# Patient Record
Sex: FEMALE | Race: BLACK OR AFRICAN AMERICAN | Employment: FULL TIME | URBAN - METROPOLITAN AREA
[De-identification: names, ages, dates, MRNs, and addresses within clinical notes are randomized per-mention and may not be internally consistent; named-entity substitution may affect disease eponyms.]

---

## 2017-10-20 ENCOUNTER — HOSPITAL ENCOUNTER (OUTPATIENT)
Age: 42
Discharge: HOME | End: 2017-10-20
Attending: FAMILY MEDICINE
Payer: COMMERCIAL

## 2017-10-20 VITALS
BODY MASS INDEX: 34.15 KG/M2 | WEIGHT: 200 LBS | RESPIRATION RATE: 18 BRPM | HEIGHT: 64 IN | SYSTOLIC BLOOD PRESSURE: 113 MMHG | OXYGEN SATURATION: 97 % | TEMPERATURE: 99 F | DIASTOLIC BLOOD PRESSURE: 82 MMHG | HEART RATE: 102 BPM

## 2017-10-20 DIAGNOSIS — J03.90 ACUTE TONSILLITIS, UNSPECIFIED ETIOLOGY: Primary | ICD-10-CM

## 2017-10-20 PROCEDURE — 87430 STREP A AG IA: CPT

## 2017-10-20 PROCEDURE — 99203 OFFICE O/P NEW LOW 30 MIN: CPT

## 2017-10-20 PROCEDURE — 99213 OFFICE O/P EST LOW 20 MIN: CPT

## 2017-10-20 RX ORDER — CLARITHROMYCIN 500 MG/1
500 TABLET, COATED ORAL 2 TIMES DAILY
Qty: 20 TABLET | Refills: 0 | Status: SHIPPED | OUTPATIENT
Start: 2017-10-20 | End: 2017-10-26

## 2017-10-20 NOTE — ED INITIAL ASSESSMENT (HPI)
Presents with sore throat, fever as high as 102F, chills, and body aches for 4 days. Also reports dry cough, nausea, and vomiting x2 on Wednesday.

## 2017-10-20 NOTE — ED PROVIDER NOTES
Patient Seen in: [facility] In [address] [place] [address]    History   Patient presents with:  Sore Throat    Stated Complaint: sore throat, fever    CC/HPI: Pt p/w co st, fever, nausea and few bouts of vomiting, temp to 102; Some cough also;   Jose Carlos Kitchen 113/82  Pulse: 102  Resp: 18  Temp: 99.1 °F (37.3 °C)  Temp src: Oral  SpO2: 97 %  O2 Device: None (Room air)    General Appearance:    Alert, cooperative, no distress, appears stated age  Head:    Normocephalic, without obvious abnormality, atraumatic  Ey Ht 162.6 cm (5' 4\")   Wt 90.7 kg   LMP 08/18/2017 (Within Days)   SpO2 97%   BMI 34.33 kg/m²         Physical Exam   Nursing note and vitals reviewed.             ED Course     Labs Reviewed   EM POCT RAPID STREP - Normal     Ss: neg  ====================

## 2017-10-22 ENCOUNTER — HOSPITAL ENCOUNTER (EMERGENCY)
Facility: HOSPITAL | Age: 42
Discharge: HOME | End: 2017-10-22
Attending: EMERGENCY MEDICINE
Payer: COMMERCIAL

## 2017-10-22 VITALS
DIASTOLIC BLOOD PRESSURE: 98 MMHG | SYSTOLIC BLOOD PRESSURE: 138 MMHG | WEIGHT: 200 LBS | OXYGEN SATURATION: 98 % | TEMPERATURE: 98 F | RESPIRATION RATE: 16 BRPM | BODY MASS INDEX: 34.15 KG/M2 | HEART RATE: 80 BPM | HEIGHT: 64 IN

## 2017-10-22 DIAGNOSIS — B34.9 VIRAL SYNDROME: Primary | ICD-10-CM

## 2017-10-22 PROCEDURE — 99283 EMERGENCY DEPT VISIT LOW MDM: CPT

## 2017-10-22 RX ORDER — DEXAMETHASONE SODIUM PHOSPHATE 4 MG/ML
8 VIAL (ML) INJECTION ONCE
Status: COMPLETED | OUTPATIENT
Start: 2017-10-22 | End: 2017-10-22

## 2017-10-22 RX ORDER — IBUPROFEN 600 MG/1
600 TABLET ORAL EVERY 8 HOURS PRN
Qty: 20 TABLET | Refills: 0 | Status: SHIPPED | OUTPATIENT
Start: 2017-10-22 | End: 2017-10-29

## 2017-10-22 NOTE — ED PROVIDER NOTES
Patient Seen in: [facility] Emergency Department    History   Patient presents with:  Fever (infectious)    Stated Complaint:     HPI     70-year-old female with no significant past medical history here with complaints of Subjective fever at home, Musculoskeletal: Negative for back pain. Skin: Negative for rash. Neurological: Negative for weakness, light-headedness and headaches. All other systems reviewed and are negative.       Positive for stated complaint:   Other systems are as noted in to nose b/l, normal gait, no facial asymmetry, normal speech     Skin: Skin is warm and dry. No rash noted. She is not diaphoretic. Psychiatric: She has a normal mood and affect. Nursing note and vitals reviewed.           ED Course   PROCEDURES:  none visit in 2 days  As needed      Medications Prescribed:  Discharge Medication List as of 10/22/2017 12:56 PM    START taking these medications    !! ibuprofen 600 MG Oral Tab  Take 1 tablet (600 mg total) by mouth every 8 (eight) hours as needed., Normal,

## 2017-10-26 ENCOUNTER — OFFICE VISIT (OUTPATIENT)
Dept: FAMILY MEDICINE CLINIC | Facility: CLINIC | Age: 42
End: 2017-10-26

## 2017-10-26 VITALS
WEIGHT: 250 LBS | TEMPERATURE: 98 F | SYSTOLIC BLOOD PRESSURE: 118 MMHG | DIASTOLIC BLOOD PRESSURE: 83 MMHG | HEIGHT: 63 IN | HEART RATE: 94 BPM | BODY MASS INDEX: 44.3 KG/M2

## 2017-10-26 DIAGNOSIS — H66.91 RIGHT OTITIS MEDIA, UNSPECIFIED OTITIS MEDIA TYPE: ICD-10-CM

## 2017-10-26 DIAGNOSIS — H10.9 CONJUNCTIVITIS OF BOTH EYES, UNSPECIFIED CONJUNCTIVITIS TYPE: Primary | ICD-10-CM

## 2017-10-26 PROCEDURE — 99213 OFFICE O/P EST LOW 20 MIN: CPT | Performed by: FAMILY MEDICINE

## 2017-10-26 PROCEDURE — 99212 OFFICE O/P EST SF 10 MIN: CPT | Performed by: FAMILY MEDICINE

## 2017-10-26 RX ORDER — AZITHROMYCIN 250 MG/1
TABLET, FILM COATED ORAL
Qty: 6 TABLET | Refills: 0 | Status: SHIPPED | OUTPATIENT
Start: 2017-10-26 | End: 2018-11-12

## 2017-10-26 RX ORDER — TOBRAMYCIN 3 MG/ML
2 SOLUTION/ DROPS OPHTHALMIC EVERY 6 HOURS
Qty: 1 BOTTLE | Refills: 1 | Status: SHIPPED | OUTPATIENT
Start: 2017-10-26 | End: 2017-11-02

## 2017-10-26 NOTE — PROGRESS NOTES
Read Thuy is a 43year old female.  Patient presents with:  Er F/u: 10/22    HPI:   Notes from Er:  51-year-old female with no significant past medical history here with complaints of Subjective fever at home, with complaints of sore throat, tinnitus, m (Oral)   Ht 5' 3\" (1.6 m)   Wt 250 lb (113.4 kg)   LMP 08/18/2017 (Within Days)   BMI 44.29 kg/m²   GENERAL: well developed, well nourished,in no apparent distress  SKIN: no rashes,no suspicious lesions  HEENT: atraumatic, normocephalic, throat are clear

## 2017-10-29 ENCOUNTER — PATIENT MESSAGE (OUTPATIENT)
Dept: FAMILY MEDICINE CLINIC | Facility: CLINIC | Age: 42
End: 2017-10-29

## 2017-10-31 NOTE — TELEPHONE ENCOUNTER
LMTCB. Please transfer to triage. Dr. Anat Baez please advise. Pt has been seen in [address] [place] [address] 10/20, 10/22 ER and 10/26 OV and still does not have significant relief.

## 2017-10-31 NOTE — TELEPHONE ENCOUNTER
From: Chintan Peraza  To: Lawrence Cruz MD  Sent: 10/29/2017 8:52 AM CDT  Subject: Visit Follow-up Question    Hi ,    I still have the ringing in my ears and i cant hear too well and my throat is still sore.  The light headed dizzy feeling is sti

## 2017-11-02 RX ORDER — PREDNISONE 20 MG/1
TABLET ORAL
Qty: 10 TABLET | Refills: 0 | Status: SHIPPED | OUTPATIENT
Start: 2017-11-02 | End: 2018-11-12

## 2017-11-03 NOTE — TELEPHONE ENCOUNTER
Dr Sanket Bonner already addressed this on 11/2/17. to Heydi Perry           1:32 PM   I sent short course of prednisone 20 mg 2 tablets daily for 5 days.  Take with food and try not to take it too late in afternoon or evenings because often wakes people up a

## 2018-03-17 ENCOUNTER — HOSPITAL ENCOUNTER (OUTPATIENT)
Dept: MAMMOGRAPHY | Age: 43
End: 2018-03-17
Attending: FAMILY MEDICINE
Payer: COMMERCIAL

## 2018-03-17 ENCOUNTER — HOSPITAL ENCOUNTER (OUTPATIENT)
Dept: MAMMOGRAPHY | Age: 43
Discharge: HOME | End: 2018-03-17
Attending: FAMILY MEDICINE
Payer: COMMERCIAL

## 2018-03-17 DIAGNOSIS — Z12.31 SCREENING MAMMOGRAM, ENCOUNTER FOR: ICD-10-CM

## 2018-03-17 PROCEDURE — 77067 SCR MAMMO BI INCL CAD: CPT | Performed by: FAMILY MEDICINE

## 2018-11-12 ENCOUNTER — HOSPITAL ENCOUNTER (OUTPATIENT)
Age: 43
Discharge: HOME | End: 2018-11-12
Attending: EMERGENCY MEDICINE
Payer: COMMERCIAL

## 2018-11-12 VITALS
SYSTOLIC BLOOD PRESSURE: 127 MMHG | OXYGEN SATURATION: 100 % | RESPIRATION RATE: 16 BRPM | BODY MASS INDEX: 52 KG/M2 | WEIGHT: 293 LBS | HEART RATE: 76 BPM | TEMPERATURE: 98 F | DIASTOLIC BLOOD PRESSURE: 85 MMHG

## 2018-11-12 DIAGNOSIS — H11.31 SUBCONJUNCTIVAL HEMORRHAGE OF RIGHT EYE: Primary | ICD-10-CM

## 2018-11-12 PROCEDURE — 99213 OFFICE O/P EST LOW 20 MIN: CPT

## 2018-11-12 PROCEDURE — 99212 OFFICE O/P EST SF 10 MIN: CPT

## 2018-11-12 NOTE — ED INITIAL ASSESSMENT (HPI)
Pt reports redness to R eye and bilateral ear pain since yesterday. Pt felt a sharp pain shoot down the the R side of her face and R ear popping that lasted for a few minutes yesterday.

## 2018-11-17 NOTE — ED PROVIDER NOTES
Patient Seen in: [facility] In [address] [place] [address]    History   Patient presents with: Eye Visual Problem (opthalmic)    Stated Complaint: ear and eye problem    HPI    38 yo female with redness to the right eye that she noticed yesterday.  No adenopathy. Neurological: She is alert and oriented to person, place, and time. No cranial nerve deficit. Skin: Skin is warm and dry. Psychiatric: She has a normal mood and affect. Her behavior is normal.   Nursing note and vitals reviewed.

## 2019-01-07 ENCOUNTER — APPOINTMENT (OUTPATIENT)
Dept: CT IMAGING | Facility: HOSPITAL | Age: 44
End: 2019-01-07
Attending: EMERGENCY MEDICINE
Payer: COMMERCIAL

## 2019-01-07 ENCOUNTER — APPOINTMENT (OUTPATIENT)
Dept: GENERAL RADIOLOGY | Facility: HOSPITAL | Age: 44
End: 2019-01-07
Payer: COMMERCIAL

## 2019-01-07 ENCOUNTER — HOSPITAL ENCOUNTER (OUTPATIENT)
Age: 44
Discharge: HOME | End: 2019-01-07
Attending: EMERGENCY MEDICINE
Payer: COMMERCIAL

## 2019-01-07 ENCOUNTER — HOSPITAL ENCOUNTER (EMERGENCY)
Facility: HOSPITAL | Age: 44
Discharge: HOME | End: 2019-01-07
Payer: COMMERCIAL

## 2019-01-07 VITALS
HEIGHT: 63 IN | OXYGEN SATURATION: 95 % | HEART RATE: 77 BPM | TEMPERATURE: 98 F | BODY MASS INDEX: 44.3 KG/M2 | WEIGHT: 250 LBS | RESPIRATION RATE: 18 BRPM | DIASTOLIC BLOOD PRESSURE: 89 MMHG | SYSTOLIC BLOOD PRESSURE: 123 MMHG

## 2019-01-07 VITALS
BODY MASS INDEX: 44 KG/M2 | WEIGHT: 250 LBS | DIASTOLIC BLOOD PRESSURE: 83 MMHG | TEMPERATURE: 97 F | HEART RATE: 82 BPM | OXYGEN SATURATION: 98 % | SYSTOLIC BLOOD PRESSURE: 121 MMHG | RESPIRATION RATE: 16 BRPM

## 2019-01-07 DIAGNOSIS — R07.9 CHEST PAIN OF UNCERTAIN ETIOLOGY: Primary | ICD-10-CM

## 2019-01-07 DIAGNOSIS — R07.89 CHEST PAIN, ATYPICAL: Primary | ICD-10-CM

## 2019-01-07 DIAGNOSIS — J18.9 ATYPICAL PNEUMONIA: ICD-10-CM

## 2019-01-07 LAB
ANION GAP SERPL CALC-SCNC: 12 MMOL/L (ref 0–18)
B-HCG UR QL: NEGATIVE
BASOPHILS # BLD: 0.1 K/UL (ref 0–0.2)
BASOPHILS NFR BLD: 1 %
BUN SERPL-MCNC: 16 MG/DL (ref 8–20)
BUN/CREAT SERPL: 22.2 (ref 10–20)
CALCIUM SERPL-MCNC: 9.3 MG/DL (ref 8.5–10.5)
CHLORIDE SERPL-SCNC: 102 MMOL/L (ref 95–110)
CO2 SERPL-SCNC: 22 MMOL/L (ref 22–32)
CREAT SERPL-MCNC: 0.72 MG/DL (ref 0.5–1.5)
D DIMER PPP FEU-MCNC: 1.31 MCG/ML (ref ?–0.5)
EOSINOPHIL # BLD: 0.3 K/UL (ref 0–0.7)
EOSINOPHIL NFR BLD: 3 %
ERYTHROCYTE [DISTWIDTH] IN BLOOD BY AUTOMATED COUNT: 15 % (ref 11–15)
GLUCOSE SERPL-MCNC: 96 MG/DL (ref 70–99)
HCT VFR BLD AUTO: 41.4 % (ref 35–48)
HGB BLD-MCNC: 13.2 G/DL (ref 12–16)
LYMPHOCYTES # BLD: 2.1 K/UL (ref 1–4)
LYMPHOCYTES NFR BLD: 22 %
MCH RBC QN AUTO: 27.7 PG (ref 27–32)
MCHC RBC AUTO-ENTMCNC: 32 G/DL (ref 32–37)
MCV RBC AUTO: 86.4 FL (ref 80–100)
MONOCYTES # BLD: 0.5 K/UL (ref 0–1)
MONOCYTES NFR BLD: 5 %
NEUTROPHILS # BLD AUTO: 6.9 K/UL (ref 1.8–7.7)
NEUTROPHILS NFR BLD: 70 %
OSMOLALITY UR CALC.SUM OF ELEC: 283 MOSM/KG (ref 275–295)
PLATELET # BLD AUTO: 330 K/UL (ref 140–400)
PMV BLD AUTO: 7.5 FL (ref 7.4–10.3)
POTASSIUM SERPL-SCNC: 3.7 MMOL/L (ref 3.3–5.1)
RBC # BLD AUTO: 4.79 M/UL (ref 3.7–5.4)
SODIUM SERPL-SCNC: 136 MMOL/L (ref 136–144)
TROPONIN I SERPL-MCNC: 0 NG/ML (ref ?–0.03)
WBC # BLD AUTO: 9.8 K/UL (ref 4–11)

## 2019-01-07 PROCEDURE — 85379 FIBRIN DEGRADATION QUANT: CPT | Performed by: EMERGENCY MEDICINE

## 2019-01-07 PROCEDURE — 99214 OFFICE O/P EST MOD 30 MIN: CPT

## 2019-01-07 PROCEDURE — 93005 ELECTROCARDIOGRAM TRACING: CPT

## 2019-01-07 PROCEDURE — 80048 BASIC METABOLIC PNL TOTAL CA: CPT

## 2019-01-07 PROCEDURE — 93010 ELECTROCARDIOGRAM REPORT: CPT

## 2019-01-07 PROCEDURE — 93010 ELECTROCARDIOGRAM REPORT: CPT | Performed by: INTERNAL MEDICINE

## 2019-01-07 PROCEDURE — 71260 CT THORAX DX C+: CPT | Performed by: EMERGENCY MEDICINE

## 2019-01-07 PROCEDURE — 71046 X-RAY EXAM CHEST 2 VIEWS: CPT

## 2019-01-07 PROCEDURE — 99285 EMERGENCY DEPT VISIT HI MDM: CPT

## 2019-01-07 PROCEDURE — 81025 URINE PREGNANCY TEST: CPT

## 2019-01-07 PROCEDURE — 85025 COMPLETE CBC W/AUTO DIFF WBC: CPT

## 2019-01-07 PROCEDURE — 93010 ELECTROCARDIOGRAM REPORT: CPT | Performed by: EMERGENCY MEDICINE

## 2019-01-07 PROCEDURE — 36415 COLL VENOUS BLD VENIPUNCTURE: CPT

## 2019-01-07 PROCEDURE — 84484 ASSAY OF TROPONIN QUANT: CPT

## 2019-01-07 RX ORDER — AZITHROMYCIN 500 MG/1
TABLET, FILM COATED ORAL
Qty: 5 TABLET | Refills: 0 | Status: SHIPPED | OUTPATIENT
Start: 2019-01-07 | End: 2019-02-20 | Stop reason: ALTCHOICE

## 2019-01-07 RX ORDER — PANTOPRAZOLE SODIUM 20 MG/1
20 TABLET, DELAYED RELEASE ORAL DAILY
Qty: 14 TABLET | Refills: 0 | Status: SHIPPED | OUTPATIENT
Start: 2019-01-07 | End: 2019-01-21

## 2019-01-08 NOTE — ED PROVIDER NOTES
Patient Seen in: [facility] Emergency Department    History   Patient presents with:  Chest Pain Angina (cardiovascular)    Stated Complaint: Chest Pain     HPI    Patient is a 14-year-old female with a history of peptic ulcer disease and GE reflu are normal. Pupils are equal, round, and reactive to light. Neck: Neck supple. Cardiovascular: Normal rate, regular rhythm, normal heart sounds and intact distal pulses.     Pulmonary/Chest: Effort normal and breath sounds normal. No respiratory distres 85  Rhythm: Sinus Rhythm  Reading: Normal              Xr Chest Pa + Lat Chest (cpt=71046)    Result Date: 1/7/2019  CONCLUSION:   Negative for radiographically evident acute intrathoracic process.   Dictated by (CST): Gonzalo Salcedo MD on 1/07/2019 at 20:

## 2019-01-08 NOTE — ED PROVIDER NOTES
Patient Seen in: [facility] Immediate Care In [address] [place] [address]    History   Patient presents with:  Chest Pain Angina (cardiovascular)    Stated Complaint: chest pain    HPI    80-year-old female with several weeks of intermittent left-sided chest discomf normal. She exhibits no distension and no mass. There is no tenderness. There is no rebound and no guarding. Musculoskeletal: Normal range of motion. She exhibits no edema or tenderness. Lymphadenopathy:     She has no cervical adenopathy.    Neurologic

## 2019-01-08 NOTE — ED INITIAL ASSESSMENT (HPI)
C/o chest pain x3 weeks which has waxed and waned, states today was more constant, denies cp at this time

## 2019-02-20 ENCOUNTER — HOSPITAL ENCOUNTER (OUTPATIENT)
Dept: GENERAL RADIOLOGY | Age: 44
Discharge: HOME | End: 2019-02-20
Attending: FAMILY MEDICINE
Payer: COMMERCIAL

## 2019-02-20 ENCOUNTER — OFFICE VISIT (OUTPATIENT)
Dept: FAMILY MEDICINE CLINIC | Facility: CLINIC | Age: 44
End: 2019-02-20
Payer: COMMERCIAL

## 2019-02-20 VITALS
DIASTOLIC BLOOD PRESSURE: 75 MMHG | BODY MASS INDEX: 45.54 KG/M2 | HEIGHT: 63 IN | WEIGHT: 257 LBS | HEART RATE: 79 BPM | SYSTOLIC BLOOD PRESSURE: 107 MMHG

## 2019-02-20 DIAGNOSIS — G89.29 CHRONIC MIDLINE LOW BACK PAIN WITHOUT SCIATICA: ICD-10-CM

## 2019-02-20 DIAGNOSIS — R93.89 ABNORMAL CT OF THE CHEST: ICD-10-CM

## 2019-02-20 DIAGNOSIS — M54.50 CHRONIC MIDLINE LOW BACK PAIN WITHOUT SCIATICA: ICD-10-CM

## 2019-02-20 DIAGNOSIS — Z01.419 ROUTINE GYNECOLOGICAL EXAMINATION: Primary | ICD-10-CM

## 2019-02-20 DIAGNOSIS — N91.2 AMENORRHEA: ICD-10-CM

## 2019-02-20 DIAGNOSIS — Z12.31 VISIT FOR SCREENING MAMMOGRAM: ICD-10-CM

## 2019-02-20 DIAGNOSIS — E66.01 MORBID OBESITY WITH BMI OF 40.0-44.9, ADULT (HCC): ICD-10-CM

## 2019-02-20 PROCEDURE — 72110 X-RAY EXAM L-2 SPINE 4/>VWS: CPT | Performed by: FAMILY MEDICINE

## 2019-02-20 PROCEDURE — 99396 PREV VISIT EST AGE 40-64: CPT | Performed by: FAMILY MEDICINE

## 2019-02-20 PROCEDURE — 99212 OFFICE O/P EST SF 10 MIN: CPT | Performed by: FAMILY MEDICINE

## 2019-02-20 PROCEDURE — 99213 OFFICE O/P EST LOW 20 MIN: CPT | Performed by: FAMILY MEDICINE

## 2019-02-20 RX ORDER — METRONIDAZOLE 7.5 MG/G
1 GEL VAGINAL NIGHTLY
Qty: 1 TUBE | Refills: 0 | Status: SHIPPED | OUTPATIENT
Start: 2019-02-20 | End: 2019-02-25

## 2019-02-20 NOTE — PROGRESS NOTES
HPI:   Yohannes Griffith is a 40year old female who presents for a complete physical exam.   Patient's last menstrual period was 08/18/2017 (within days). No menses for 2 years. No hot flashes. Frustrated with her weight. Thinking about surgery.    Also con denies dyspareunia  MUSCULOSKELETAL: pos back pain  NEURO: denies headaches  PSYCHE: denies depression or anxiety  HEMATOLOGIC: denies hx of anemia  ENDOCRINE: denies thyroid history  ALL/ASTHMA: denies hx of allergy or asthma    EXAM:   /75 (BP [facility] x-ray and referral to chiropractor  - XR LUMBAR SPINE (MIN 4 VIEWS) (CPT=72110); Future  - CHIROPRACTIC  - INTERNAL       Pap and pelvic done. Order put in for mammogram.. Self breast exam explained. Health maintenance.  Pt' s weight is Body mass index is 4

## 2019-02-21 LAB — HPV I/H RISK 1 DNA SPEC QL NAA+PROBE: POSITIVE

## 2019-02-24 LAB
HPV16 DNA CVX QL PROBE+SIG AMP: NEGATIVE
HPV18 DNA CVX QL PROBE+SIG AMP: POSITIVE

## 2019-02-25 NOTE — PROGRESS NOTES
Celio Juan - Your x-rays shows mild disc narrowing which can cause some impingement on the nerve and pain.  Please see the chiropractor and he can help you with adjustments to help this and physical therapy would be good. - Dr. Adela Goddard

## 2019-02-25 NOTE — PROGRESS NOTES
I Called pt but no answer so advised to call back to get test results. 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 Needs to have colpo. Please give number for dr. Rhina Ortiz group to schedule an appt.

## 2019-03-02 ENCOUNTER — LAB ENCOUNTER (OUTPATIENT)
Dept: LAB | Facility: HOSPITAL | Age: 44
End: 2019-03-02
Attending: FAMILY MEDICINE
Payer: COMMERCIAL

## 2019-03-02 DIAGNOSIS — N91.2 AMENORRHEA: ICD-10-CM

## 2019-03-02 DIAGNOSIS — Z01.419 ROUTINE GYNECOLOGICAL EXAMINATION: ICD-10-CM

## 2019-03-02 LAB
ALBUMIN SERPL-MCNC: 3.7 G/DL (ref 3.4–5)
ALBUMIN/GLOB SERPL: 0.9 {RATIO} (ref 1–2)
ALP LIVER SERPL-CCNC: 105 U/L (ref 37–98)
ALT SERPL-CCNC: 14 U/L (ref 13–56)
ANION GAP SERPL CALC-SCNC: 6 MMOL/L (ref 0–18)
AST SERPL-CCNC: 14 U/L (ref 15–37)
BILIRUB SERPL-MCNC: 0.3 MG/DL (ref 0.1–2)
BUN BLD-MCNC: 18 MG/DL (ref 7–18)
BUN/CREAT SERPL: 25.4 (ref 10–20)
CALCIUM BLD-MCNC: 8.9 MG/DL (ref 8.5–10.1)
CHLORIDE SERPL-SCNC: 107 MMOL/L (ref 98–107)
CHOLEST SMN-MCNC: 236 MG/DL (ref ?–200)
CO2 SERPL-SCNC: 27 MMOL/L (ref 21–32)
CREAT BLD-MCNC: 0.71 MG/DL (ref 0.55–1.02)
FSH SERPL-ACNC: 65.5 MIU/ML
GLOBULIN PLAS-MCNC: 4.3 G/DL (ref 2.8–4.4)
GLUCOSE BLD-MCNC: 92 MG/DL (ref 70–99)
HDLC SERPL-MCNC: 72 MG/DL (ref 40–59)
LDLC SERPL CALC-MCNC: 148 MG/DL (ref ?–100)
M PROTEIN MFR SERPL ELPH: 8 G/DL (ref 6.4–8.2)
NONHDLC SERPL-MCNC: 164 MG/DL (ref ?–130)
OSMOLALITY SERPL CALC.SUM OF ELEC: 292 MOSM/KG (ref 275–295)
POTASSIUM SERPL-SCNC: 4 MMOL/L (ref 3.5–5.1)
SODIUM SERPL-SCNC: 140 MMOL/L (ref 136–145)
TRIGL SERPL-MCNC: 78 MG/DL (ref 30–149)
TSI SER-ACNC: 2.25 MIU/ML (ref 0.36–3.74)
VIT B12 SERPL-MCNC: 581 PG/ML (ref 193–986)
VLDLC SERPL CALC-MCNC: 16 MG/DL (ref 0–30)

## 2019-03-02 PROCEDURE — 82671 ASSAY OF ESTROGENS: CPT

## 2019-03-02 PROCEDURE — 82306 VITAMIN D 25 HYDROXY: CPT

## 2019-03-02 PROCEDURE — 82607 VITAMIN B-12: CPT

## 2019-03-02 PROCEDURE — 36415 COLL VENOUS BLD VENIPUNCTURE: CPT

## 2019-03-02 PROCEDURE — 80053 COMPREHEN METABOLIC PANEL: CPT

## 2019-03-02 PROCEDURE — 84443 ASSAY THYROID STIM HORMONE: CPT

## 2019-03-02 PROCEDURE — 83001 ASSAY OF GONADOTROPIN (FSH): CPT

## 2019-03-02 PROCEDURE — 80061 LIPID PANEL: CPT

## 2019-03-04 LAB — 25(OH)D3 SERPL-MCNC: 10.5 NG/ML (ref 30–100)

## 2019-03-05 LAB
ESTRADIOL BY TMS: 14.4 PG/ML
ESTROGENS TOTAL CALCULATION: 70.1 PG/ML
ESTRONE BY TMS: 55.7 PG/ML

## 2019-03-09 ENCOUNTER — HOSPITAL ENCOUNTER (OUTPATIENT)
Dept: CT IMAGING | Age: 44
Discharge: HOME | End: 2019-03-09
Attending: FAMILY MEDICINE
Payer: COMMERCIAL

## 2019-03-09 DIAGNOSIS — R93.89 ABNORMAL CT OF THE CHEST: ICD-10-CM

## 2019-03-09 PROCEDURE — 71250 CT THORAX DX C-: CPT | Performed by: FAMILY MEDICINE

## 2019-03-12 ENCOUNTER — TELEPHONE (OUTPATIENT)
Dept: FAMILY MEDICINE CLINIC | Facility: CLINIC | Age: 44
End: 2019-03-12

## 2019-03-12 ENCOUNTER — TELEPHONE (OUTPATIENT)
Dept: OBGYN CLINIC | Facility: CLINIC | Age: 44
End: 2019-03-12

## 2019-03-12 ENCOUNTER — APPOINTMENT (OUTPATIENT)
Dept: LAB | Facility: HOSPITAL | Age: 44
End: 2019-03-12
Attending: OBSTETRICS & GYNECOLOGY
Payer: COMMERCIAL

## 2019-03-12 ENCOUNTER — OFFICE VISIT (OUTPATIENT)
Dept: OBGYN CLINIC | Facility: CLINIC | Age: 44
End: 2019-03-12
Payer: COMMERCIAL

## 2019-03-12 VITALS
DIASTOLIC BLOOD PRESSURE: 85 MMHG | HEART RATE: 82 BPM | SYSTOLIC BLOOD PRESSURE: 132 MMHG | BODY MASS INDEX: 45 KG/M2 | WEIGHT: 256 LBS

## 2019-03-12 DIAGNOSIS — R87.610 ASCUS WITH POSITIVE HIGH RISK HPV CERVICAL: ICD-10-CM

## 2019-03-12 DIAGNOSIS — R87.810 ASCUS WITH POSITIVE HIGH RISK HPV CERVICAL: ICD-10-CM

## 2019-03-12 DIAGNOSIS — Z32.00 PREGNANCY EXAMINATION OR TEST, PREGNANCY UNCONFIRMED: Primary | ICD-10-CM

## 2019-03-12 DIAGNOSIS — R87.810 ASCUS WITH POSITIVE HIGH RISK HPV CERVICAL: Primary | ICD-10-CM

## 2019-03-12 DIAGNOSIS — Z32.00 PREGNANCY EXAMINATION OR TEST, PREGNANCY UNCONFIRMED: ICD-10-CM

## 2019-03-12 DIAGNOSIS — R87.610 ASCUS WITH POSITIVE HIGH RISK HPV CERVICAL: Primary | ICD-10-CM

## 2019-03-12 LAB — B-HCG SERPL-ACNC: 5 MIU/ML

## 2019-03-12 PROCEDURE — 36415 COLL VENOUS BLD VENIPUNCTURE: CPT

## 2019-03-12 PROCEDURE — 99203 OFFICE O/P NEW LOW 30 MIN: CPT | Performed by: OBSTETRICS & GYNECOLOGY

## 2019-03-12 PROCEDURE — 84703 CHORIONIC GONADOTROPIN ASSAY: CPT

## 2019-03-12 PROCEDURE — 84702 CHORIONIC GONADOTROPIN TEST: CPT

## 2019-03-12 RX ORDER — CHOLECALCIFEROL (VITAMIN D3) 1250 MCG
1 CAPSULE ORAL WEEKLY
Qty: 12 CAPSULE | Refills: 2 | Status: SHIPPED | OUTPATIENT
Start: 2019-03-12 | End: 2021-01-04

## 2019-03-12 NOTE — PROGRESS NOTES
Earnestine - Your vitamin D levels are extremely low. I want you to start on weekly vitamin D 50,000 units. I am sending it in. Also cholesterol is quite high - but better compared to 2 years ago. Continue improving your diet and increased exercise.  Also your h

## 2019-03-12 NOTE — TELEPHONE ENCOUNTER
Spoke with pt and informed her of MAZs recs. Pt informed that quant is in process and pt instructed to call tomorrow AM for results. Pt informed that based on results, her colpo may need to be cancelled. Pt verbalized understanding.

## 2019-03-12 NOTE — TELEPHONE ENCOUNTER
----- Message from Ralph Brooks DO sent at 3/12/2019  4:27 PM CDT -----  Please have lab turn this into a quant. Pt hasn't had period in 3 years. Scheduled for colpo tomorrow. May need to reschedule.

## 2019-03-13 ENCOUNTER — OFFICE VISIT (OUTPATIENT)
Dept: OBGYN CLINIC | Facility: CLINIC | Age: 44
End: 2019-03-13
Payer: COMMERCIAL

## 2019-03-13 VITALS
SYSTOLIC BLOOD PRESSURE: 119 MMHG | DIASTOLIC BLOOD PRESSURE: 82 MMHG | HEART RATE: 75 BPM | BODY MASS INDEX: 45 KG/M2 | WEIGHT: 256.63 LBS

## 2019-03-13 DIAGNOSIS — R87.610 ASCUS WITH POSITIVE HIGH RISK HPV CERVICAL: Primary | ICD-10-CM

## 2019-03-13 DIAGNOSIS — R87.810 ASCUS WITH POSITIVE HIGH RISK HPV CERVICAL: Primary | ICD-10-CM

## 2019-03-13 PROCEDURE — 57456 ENDOCERV CURETTAGE W/SCOPE: CPT | Performed by: OBSTETRICS & GYNECOLOGY

## 2019-03-13 NOTE — H&P
HPI:  The patient is a 12-year-old sexually active female who presents for consultation regarding abnormal Pap smear. Sent by her PCP.   Patient had a Pap smear on 2/20/2019 that resulted as an ASCUS HPV positive Pap smear with a high risk HPV positive gen file        Gets together: Not on file        Attends Rastafarian service: Not on file        Active member of club or organization: Not on file        Attends meetings of clubs or organizations: Not on file        Relationship status: Not on file      Intim HPV positive Pap smear from PCP  -We discussed the need for colposcopy to evaluate her abnormal Pap smear.   We discussed what a Pap smear  screening, HPV, and indications for colposcopy based on ASCCP guidelines.  -Patient will need serum hCG the day befor

## 2019-03-13 NOTE — PROGRESS NOTES
Yuli Price - The area of concern on the CT of the lungs is decreasing in size which is consistent with an infection.  Will plant to repeat it in 1 year. - Dr. Ravindra Oswald

## 2019-03-13 NOTE — PROCEDURES
Colpo w/Cx Biopsy and ECC    Pregnancy Results: negative from blood test   Birth control method(s) used: condoms; no period x 3 years    Consent signed.     Procedure discussed with patient in detail including indication, risk, benefits, alternatives and co

## 2019-03-16 ENCOUNTER — HOSPITAL ENCOUNTER (OUTPATIENT)
Dept: MAMMOGRAPHY | Age: 44
Discharge: HOME | End: 2019-03-16
Attending: FAMILY MEDICINE
Payer: COMMERCIAL

## 2019-03-16 DIAGNOSIS — Z12.31 VISIT FOR SCREENING MAMMOGRAM: ICD-10-CM

## 2019-03-16 PROCEDURE — 77067 SCR MAMMO BI INCL CAD: CPT | Performed by: FAMILY MEDICINE

## 2019-03-16 PROCEDURE — 77063 BREAST TOMOSYNTHESIS BI: CPT | Performed by: FAMILY MEDICINE

## 2019-03-18 ENCOUNTER — PATIENT MESSAGE (OUTPATIENT)
Dept: OBGYN CLINIC | Facility: CLINIC | Age: 44
End: 2019-03-18

## 2019-03-18 NOTE — TELEPHONE ENCOUNTER
From: Gaston Argueta  To: Laina Javier DO  Sent: 3/18/2019 12:30 PM CDT  Subject: Test Results Question    Hi Dr. Lilliam Ramos all is well! This is Enrigue Bel, I was wondering if my test results were available?  If so can you please let me k

## 2019-04-24 ENCOUNTER — OFFICE VISIT (OUTPATIENT)
Dept: SURGERY | Facility: CLINIC | Age: 44
End: 2019-04-24
Payer: COMMERCIAL

## 2019-04-24 VITALS
SYSTOLIC BLOOD PRESSURE: 117 MMHG | HEIGHT: 63 IN | OXYGEN SATURATION: 99 % | HEART RATE: 76 BPM | DIASTOLIC BLOOD PRESSURE: 79 MMHG | BODY MASS INDEX: 45.18 KG/M2 | RESPIRATION RATE: 16 BRPM | WEIGHT: 255 LBS

## 2019-04-24 DIAGNOSIS — E66.01 MORBID OBESITY WITH BMI OF 45.0-49.9, ADULT (HCC): ICD-10-CM

## 2019-04-24 DIAGNOSIS — E55.9 VITAMIN D DEFICIENCY: ICD-10-CM

## 2019-04-24 DIAGNOSIS — R12 HEART BURN: ICD-10-CM

## 2019-04-24 DIAGNOSIS — E78.00 HYPERCHOLESTEREMIA: Primary | ICD-10-CM

## 2019-04-24 DIAGNOSIS — R63.5 WEIGHT GAIN: ICD-10-CM

## 2019-04-24 PROCEDURE — 99204 OFFICE O/P NEW MOD 45 MIN: CPT | Performed by: INTERNAL MEDICINE

## 2019-04-24 RX ORDER — PHENTERMINE HYDROCHLORIDE 15 MG/1
15 CAPSULE ORAL EVERY MORNING
Qty: 30 CAPSULE | Refills: 2 | Status: SHIPPED | OUTPATIENT
Start: 2019-04-24 | End: 2019-07-24 | Stop reason: ALTCHOICE

## 2019-04-24 NOTE — PROGRESS NOTES
[facility] Consultation Note       Date of Consult:  2019    Patient:  Gaston Argueta  :      1975  MRN:      [id number]    Referring Provider: Dr. Krishan Gauthier       Chief Complaint:  Patient presents with:  Consult  Weight Manageme file        Inability: Not on file      Transportation needs:        Medical: Not on file        Non-medical: Not on file    Tobacco Use      Smoking status: Never Smoker      Smokeless tobacco: Never Used    Substance and Sexual Activity      Alcohol use: and Eat 3-4 cups of fresh fruit or vegetables daily    Behavior Modifications Reviewed and Discussed:    Eat breakfast, Eat 3 meals per day, Plan meals in advance, Read nutrition labels, Drink 64oz of water per day, Maintain a daily food journal, No drinki Patient is not interested in bariatric surgery. Patient desires to pursue traditional weight loss at this time. DYSLIPIDEMIA: Stable on the above prescribed meal plan . Liver function stable.     Lab Results   Component Value Date/Time    CHOLEST 2

## 2019-06-05 ENCOUNTER — NURSE TRIAGE (OUTPATIENT)
Dept: FAMILY MEDICINE CLINIC | Facility: CLINIC | Age: 44
End: 2019-06-05

## 2019-06-05 NOTE — TELEPHONE ENCOUNTER
LMTCB, need to triage patient, [facility] message also sent            To:  EM MIHAI Ivy      From: Spike Reynoso      Created: 6/5/2019 5:32 AM        *  Hi Dr. Radha Rivero all is well  I have a couple questions  I've been noticing blood in my stool, should

## 2019-06-10 ENCOUNTER — OFFICE VISIT (OUTPATIENT)
Dept: SURGERY | Facility: CLINIC | Age: 44
End: 2019-06-10
Payer: COMMERCIAL

## 2019-06-10 VITALS
BODY MASS INDEX: 44.92 KG/M2 | RESPIRATION RATE: 16 BRPM | SYSTOLIC BLOOD PRESSURE: 114 MMHG | HEIGHT: 63 IN | WEIGHT: 253.5 LBS | DIASTOLIC BLOOD PRESSURE: 78 MMHG | HEART RATE: 80 BPM | OXYGEN SATURATION: 98 %

## 2019-06-10 DIAGNOSIS — R12 HEART BURN: ICD-10-CM

## 2019-06-10 DIAGNOSIS — Z51.81 ENCOUNTER FOR THERAPEUTIC DRUG MONITORING: ICD-10-CM

## 2019-06-10 DIAGNOSIS — E66.01 MORBID OBESITY WITH BMI OF 45.0-49.9, ADULT (HCC): ICD-10-CM

## 2019-06-10 DIAGNOSIS — E78.00 HYPERCHOLESTEREMIA: Primary | ICD-10-CM

## 2019-06-10 DIAGNOSIS — E55.9 VITAMIN D DEFICIENCY: ICD-10-CM

## 2019-06-10 PROCEDURE — 99214 OFFICE O/P EST MOD 30 MIN: CPT | Performed by: INTERNAL MEDICINE

## 2019-06-10 RX ORDER — TOPIRAMATE 25 MG/1
25 TABLET ORAL EVERY EVENING
Qty: 30 TABLET | Refills: 2 | Status: SHIPPED | OUTPATIENT
Start: 2019-06-10 | End: 2021-03-16

## 2019-06-10 NOTE — PROGRESS NOTES
[facility], [address] [place] [address] [address] [facility],[address]  Dept: [phone]       Patient:  Phillip Nieto  :      1975  MRN:      [id number]    Chief Complaint:  Patient presents wit file    Tobacco Use      Smoking status: Never Smoker      Smokeless tobacco: Never Used    Substance and Sexual Activity      Alcohol use: No      Drug use: No      Sexual activity: Not on file    Lifestyle      Physical activity:        Days per week: No back pain    Nutritional Goals  Limit carbohydrates to 100 gms per day, Eat 100-200 calories within 1 hour of waking  and Eat 3-4 cups of fresh fruits or vegetables daily    Behavior Modifications Reviewed and Discussed  Eat breakfast, Eat 3 meals per day, 03/02/2019 06:35 AM       Encounter Diagnosis(ses):   Hypercholesteremia  (primary encounter diagnosis)  Vitamin d deficiency  Heart burn  Morbid obesity with bmi of 45.0-49.9, adult (hcc)  Encounter for therapeutic drug monitoring    PLAN     Given the pa

## 2019-06-27 ENCOUNTER — TELEPHONE (OUTPATIENT)
Dept: SURGERY | Facility: CLINIC | Age: 44
End: 2019-06-27

## 2019-06-27 NOTE — TELEPHONE ENCOUNTER
6/27/19 @ 11:53am Spoke to [place] at [facility], [phone], UA#[id number].  He verified that patient has following benefits for Bariatric services:   • NO Bariatric Benefits Under Plan  • JOANNV (KFV#[id number]) DX E66.01 & E66.9  - Dietitian ([id number]

## 2019-07-01 ENCOUNTER — TELEPHONE (OUTPATIENT)
Dept: SURGERY | Facility: CLINIC | Age: 44
End: 2019-07-01

## 2019-07-24 ENCOUNTER — OFFICE VISIT (OUTPATIENT)
Dept: SURGERY | Facility: CLINIC | Age: 44
End: 2019-07-24
Payer: COMMERCIAL

## 2019-07-24 VITALS
OXYGEN SATURATION: 98 % | DIASTOLIC BLOOD PRESSURE: 81 MMHG | HEART RATE: 70 BPM | HEIGHT: 63 IN | BODY MASS INDEX: 44.47 KG/M2 | WEIGHT: 251 LBS | SYSTOLIC BLOOD PRESSURE: 120 MMHG

## 2019-07-24 DIAGNOSIS — Z51.81 ENCOUNTER FOR THERAPEUTIC DRUG MONITORING: ICD-10-CM

## 2019-07-24 DIAGNOSIS — E66.01 MORBID OBESITY WITH BMI OF 40.0-44.9, ADULT (HCC): ICD-10-CM

## 2019-07-24 DIAGNOSIS — E78.00 HYPERCHOLESTEREMIA: Primary | ICD-10-CM

## 2019-07-24 PROCEDURE — 99214 OFFICE O/P EST MOD 30 MIN: CPT | Performed by: INTERNAL MEDICINE

## 2019-07-24 NOTE — PROGRESS NOTES
[facility], [address] [place] [address] [address] [place] [address],[address]  Dept: [phone]       Patient:  Angela Fulton  :      1975  MRN:      [id number]    Chief Complaint:  Patient presents wit Not on file        Inability: Not on file      Transportation needs:        Medical: Not on file        Non-medical: Not on file    Tobacco Use      Smoking status: Never Smoker      Smokeless tobacco: Never Used    Substance and Sexual Activity      Alcoh diet  · Amount of water (in ounces) per day:    · Drinking between meals only:  yes  · Toughest challenge:  Exercise due to back pain    Nutritional Goals  Limit carbohydrates to 100 gms per day, Eat 100-200 calories within 1 hour of waking  and Eat 3-4 cu 06:35 AM     (H) 03/02/2019 06:35 AM    HDL 72 (H) 03/02/2019 06:35 AM    TRIG 78 03/02/2019 06:35 AM    VLDL 16 03/02/2019 06:35 AM       Encounter Diagnosis(ses):   Hypercholesteremia  (primary encounter diagnosis)  Encounter for therapeutic drug

## 2019-08-08 ENCOUNTER — TELEPHONE (OUTPATIENT)
Dept: SURGERY | Facility: CLINIC | Age: 44
End: 2019-08-08

## 2019-09-03 NOTE — TELEPHONE ENCOUNTER
Per the request of the patient, Called and left a detailed message with re-verification of Bariatric Benefit information received from insurance company.  Per insurance, there is no coverage for Bariatric Surgery for the diagnosis of either Morbid Obesity (

## 2020-02-19 ENCOUNTER — HOSPITAL ENCOUNTER (OUTPATIENT)
Age: 45
Discharge: HOME | End: 2020-02-19
Attending: EMERGENCY MEDICINE
Payer: COMMERCIAL

## 2020-02-19 VITALS
BODY MASS INDEX: 46.95 KG/M2 | DIASTOLIC BLOOD PRESSURE: 82 MMHG | TEMPERATURE: 98 F | RESPIRATION RATE: 20 BRPM | WEIGHT: 275 LBS | OXYGEN SATURATION: 100 % | HEIGHT: 64 IN | SYSTOLIC BLOOD PRESSURE: 120 MMHG | HEART RATE: 98 BPM

## 2020-02-19 DIAGNOSIS — J01.10 ACUTE NON-RECURRENT FRONTAL SINUSITIS: Primary | ICD-10-CM

## 2020-02-19 PROCEDURE — 99213 OFFICE O/P EST LOW 20 MIN: CPT

## 2020-02-19 PROCEDURE — 99214 OFFICE O/P EST MOD 30 MIN: CPT

## 2020-02-19 RX ORDER — ECHINACEA PURPUREA EXTRACT 125 MG
2 TABLET ORAL AS NEEDED
Qty: 60 ML | Refills: 0 | Status: SHIPPED | OUTPATIENT
Start: 2020-02-19 | End: 2020-02-24

## 2020-02-19 RX ORDER — AMOXICILLIN AND CLAVULANATE POTASSIUM 875; 125 MG/1; MG/1
1 TABLET, FILM COATED ORAL 2 TIMES DAILY
Qty: 20 TABLET | Refills: 0 | Status: SHIPPED | OUTPATIENT
Start: 2020-02-19 | End: 2020-02-29

## 2020-02-19 RX ORDER — FLUTICASONE PROPIONATE 50 MCG
2 SPRAY, SUSPENSION (ML) NASAL DAILY
Qty: 16 G | Refills: 0 | Status: SHIPPED | OUTPATIENT
Start: 2020-02-19 | End: 2020-03-20

## 2020-02-19 RX ORDER — BENZONATATE 100 MG/1
100 CAPSULE ORAL 3 TIMES DAILY PRN
Qty: 30 CAPSULE | Refills: 0 | Status: SHIPPED | OUTPATIENT
Start: 2020-02-19 | End: 2020-03-20

## 2020-02-19 RX ORDER — ALBUTEROL SULFATE 90 UG/1
2 AEROSOL, METERED RESPIRATORY (INHALATION) EVERY 4 HOURS PRN
Qty: 1 INHALER | Refills: 0 | Status: SHIPPED | OUTPATIENT
Start: 2020-02-19 | End: 2020-03-20

## 2020-02-19 NOTE — ED PROVIDER NOTES
Patient Seen in: [facility]mediate [facility] In [address] [place] [address]    History   Patient presents with:  Dizziness    Stated Complaint: lightheaded, dizzy, ear pain    HPI      HISTORY OF PRESENT ILLNESS:Patient complains of URI symptoms for 7 days.   Complains Smoking status: Never Smoker      Smokeless tobacco: Never Used    Alcohol use: No    Drug use: No      Review of Systems    Positive for stated complaint: lightheaded, dizzy, ear pain  Other systems are as noted in HPI.   Constitutional and vital signs r 0    Saline Nasal Spray (AYR) 0.65 % Nasal Solution  2 sprays by Nasal route as needed for congestion.  3-4x/day  Qty: 60 mL Refills: 0    Albuterol Sulfate  (90 Base) MCG/ACT Inhalation Aero Soln  Inhale 2 puffs into the lungs every 4 (four) hours a

## 2020-02-19 NOTE — ED INITIAL ASSESSMENT (HPI)
Pt states a week ago, pt had dizziness. On Friday, pt started with cough and fever of 102. Pt has fever and chills and cough.

## 2021-01-04 RX ORDER — CHOLECALCIFEROL (VITAMIN D3) 1250 MCG
1 CAPSULE ORAL WEEKLY
Qty: 12 CAPSULE | Refills: 0 | Status: SHIPPED | OUTPATIENT
Start: 2021-01-04 | End: 2023-02-15

## 2021-03-16 ENCOUNTER — OFFICE VISIT (OUTPATIENT)
Dept: FAMILY MEDICINE CLINIC | Facility: CLINIC | Age: 46
End: 2021-03-16
Payer: COMMERCIAL

## 2021-03-16 VITALS
DIASTOLIC BLOOD PRESSURE: 71 MMHG | WEIGHT: 261.63 LBS | TEMPERATURE: 97 F | HEART RATE: 83 BPM | HEIGHT: 64 IN | BODY MASS INDEX: 44.66 KG/M2 | SYSTOLIC BLOOD PRESSURE: 105 MMHG

## 2021-03-16 DIAGNOSIS — E55.9 VITAMIN D DEFICIENCY: ICD-10-CM

## 2021-03-16 DIAGNOSIS — K92.1 BLACK STOOL: ICD-10-CM

## 2021-03-16 DIAGNOSIS — Z00.00 ROUTINE MEDICAL EXAM: Primary | ICD-10-CM

## 2021-03-16 DIAGNOSIS — E28.319 EARLY MENOPAUSE: ICD-10-CM

## 2021-03-16 DIAGNOSIS — Z12.31 SCREENING MAMMOGRAM, ENCOUNTER FOR: ICD-10-CM

## 2021-03-16 DIAGNOSIS — G47.00 INSOMNIA, UNSPECIFIED TYPE: ICD-10-CM

## 2021-03-16 PROCEDURE — 3078F DIAST BP <80 MM HG: CPT | Performed by: FAMILY MEDICINE

## 2021-03-16 PROCEDURE — 99396 PREV VISIT EST AGE 40-64: CPT | Performed by: FAMILY MEDICINE

## 2021-03-16 PROCEDURE — 3008F BODY MASS INDEX DOCD: CPT | Performed by: FAMILY MEDICINE

## 2021-03-16 PROCEDURE — 3074F SYST BP LT 130 MM HG: CPT | Performed by: FAMILY MEDICINE

## 2021-03-16 RX ORDER — TRAZODONE HYDROCHLORIDE 50 MG/1
50 TABLET ORAL NIGHTLY PRN
Qty: 30 TABLET | Refills: 0 | Status: SHIPPED | OUTPATIENT
Start: 2021-03-16 | End: 2021-04-17

## 2021-03-16 NOTE — PROGRESS NOTES
HPI:   Cindy Berg is a 55year old female who presents for a complete physical exam.    Pt reports had been depressed with COVID but a bit better. Loss family members to covid and work issues[place] Started at gym again.  No mense abdominal pain,denies heartburn  : denies dysuria, vaginal discharge or itching,irregular menses  MUSCULOSKELETAL: denies back pain  NEURO: denies headaches  PSYCHE: denies depression or anxiety  HEMATOLOGIC: denies hx of anemia or easy bruising  ENDOCRI

## 2021-03-20 ENCOUNTER — LAB ENCOUNTER (OUTPATIENT)
Dept: LAB | Age: 46
End: 2021-03-20
Attending: FAMILY MEDICINE
Payer: COMMERCIAL

## 2021-03-20 DIAGNOSIS — E55.9 VITAMIN D DEFICIENCY: ICD-10-CM

## 2021-03-20 DIAGNOSIS — Z00.00 ROUTINE MEDICAL EXAM: ICD-10-CM

## 2021-03-20 DIAGNOSIS — K92.1 BLACK STOOL: ICD-10-CM

## 2021-03-20 LAB
ALBUMIN SERPL-MCNC: 3.8 G/DL (ref 3.4–5)
ALBUMIN/GLOB SERPL: 0.9 {RATIO} (ref 1–2)
ALP LIVER SERPL-CCNC: 109 U/L
ALT SERPL-CCNC: 18 U/L
ANION GAP SERPL CALC-SCNC: 8 MMOL/L (ref 0–18)
AST SERPL-CCNC: 14 U/L (ref 15–37)
BASOPHILS # BLD AUTO: 0.05 X10(3) UL (ref 0–0.2)
BASOPHILS NFR BLD AUTO: 0.7 %
BILIRUB SERPL-MCNC: 0.3 MG/DL (ref 0.1–2)
BUN BLD-MCNC: 20 MG/DL (ref 7–18)
BUN/CREAT SERPL: 24.4 (ref 10–20)
CALCIUM BLD-MCNC: 9 MG/DL (ref 8.5–10.1)
CHLORIDE SERPL-SCNC: 107 MMOL/L (ref 98–112)
CHOLEST SMN-MCNC: 237 MG/DL (ref ?–200)
CO2 SERPL-SCNC: 25 MMOL/L (ref 21–32)
CREAT BLD-MCNC: 0.82 MG/DL
DEPRECATED RDW RBC AUTO: 45.5 FL (ref 35.1–46.3)
EOSINOPHIL # BLD AUTO: 0.2 X10(3) UL (ref 0–0.7)
EOSINOPHIL NFR BLD AUTO: 2.9 %
ERYTHROCYTE [DISTWIDTH] IN BLOOD BY AUTOMATED COUNT: 13.7 % (ref 11–15)
GLOBULIN PLAS-MCNC: 4.3 G/DL (ref 2.8–4.4)
GLUCOSE BLD-MCNC: 101 MG/DL (ref 70–99)
HCT VFR BLD AUTO: 40 %
HDLC SERPL-MCNC: 72 MG/DL (ref 40–59)
HGB BLD-MCNC: 12.1 G/DL
IMM GRANULOCYTES # BLD AUTO: 0.01 X10(3) UL (ref 0–1)
IMM GRANULOCYTES NFR BLD: 0.1 %
LDLC SERPL CALC-MCNC: 151 MG/DL (ref ?–100)
LYMPHOCYTES # BLD AUTO: 2.29 X10(3) UL (ref 1–4)
LYMPHOCYTES NFR BLD AUTO: 32.7 %
M PROTEIN MFR SERPL ELPH: 8.1 G/DL (ref 6.4–8.2)
MCH RBC QN AUTO: 27.3 PG (ref 26–34)
MCHC RBC AUTO-ENTMCNC: 30.3 G/DL (ref 31–37)
MCV RBC AUTO: 90.1 FL
MONOCYTES # BLD AUTO: 0.41 X10(3) UL (ref 0.1–1)
MONOCYTES NFR BLD AUTO: 5.8 %
NEUTROPHILS # BLD AUTO: 4.05 X10 (3) UL (ref 1.5–7.7)
NEUTROPHILS # BLD AUTO: 4.05 X10(3) UL (ref 1.5–7.7)
NEUTROPHILS NFR BLD AUTO: 57.8 %
NONHDLC SERPL-MCNC: 165 MG/DL (ref ?–130)
OSMOLALITY SERPL CALC.SUM OF ELEC: 293 MOSM/KG (ref 275–295)
PATIENT FASTING Y/N/NP: YES
PATIENT FASTING Y/N/NP: YES
PLATELET # BLD AUTO: 309 10(3)UL (ref 150–450)
POTASSIUM SERPL-SCNC: 4.3 MMOL/L (ref 3.5–5.1)
RBC # BLD AUTO: 4.44 X10(6)UL
SODIUM SERPL-SCNC: 140 MMOL/L (ref 136–145)
TRIGL SERPL-MCNC: 72 MG/DL (ref 30–149)
TSI SER-ACNC: 1.83 MIU/ML (ref 0.36–3.74)
VIT B12 SERPL-MCNC: 572 PG/ML (ref 193–986)
VLDLC SERPL CALC-MCNC: 14 MG/DL (ref 0–30)
WBC # BLD AUTO: 7 X10(3) UL (ref 4–11)

## 2021-03-20 PROCEDURE — 80061 LIPID PANEL: CPT

## 2021-03-20 PROCEDURE — 36415 COLL VENOUS BLD VENIPUNCTURE: CPT

## 2021-03-20 PROCEDURE — 85025 COMPLETE CBC W/AUTO DIFF WBC: CPT

## 2021-03-20 PROCEDURE — 82306 VITAMIN D 25 HYDROXY: CPT

## 2021-03-20 PROCEDURE — 82607 VITAMIN B-12: CPT

## 2021-03-20 PROCEDURE — 80053 COMPREHEN METABOLIC PANEL: CPT

## 2021-03-20 PROCEDURE — 84443 ASSAY THYROID STIM HORMONE: CPT

## 2021-03-22 LAB — 25(OH)D3 SERPL-MCNC: 11.5 NG/ML (ref 30–100)

## 2021-03-25 NOTE — PROGRESS NOTES
Eugenio Colby -  Your vitamin D levels are extremely low. Please restart weekly high dose vitamin D if you stopped it. You always need to be on that. Your cholesterol is still high. I want you to consider starting medications - crestor 5 mg nightly.  Let me know

## 2021-03-26 ENCOUNTER — HOSPITAL ENCOUNTER (OUTPATIENT)
Dept: MAMMOGRAPHY | Age: 46
Discharge: HOME | End: 2021-03-26
Attending: FAMILY MEDICINE
Payer: COMMERCIAL

## 2021-03-26 ENCOUNTER — TELEPHONE (OUTPATIENT)
Dept: FAMILY MEDICINE CLINIC | Facility: CLINIC | Age: 46
End: 2021-03-26

## 2021-03-26 DIAGNOSIS — N63.10 LUMP OF RIGHT BREAST: Primary | ICD-10-CM

## 2021-03-26 DIAGNOSIS — Z12.31 SCREENING MAMMOGRAM, ENCOUNTER FOR: ICD-10-CM

## 2021-03-26 NOTE — TELEPHONE ENCOUNTER
Spoke with Luz Elena. Patient is at [facility] now for screening mammogram. Patient told her the mammogram was ordered because she has lumps in right breast after exam with Dr. Bronson Kemp.      Dr. Bronson Kemp if this is the case, they need the order changed from a screening

## 2021-03-26 NOTE — TELEPHONE ENCOUNTER
Well too late probably but I never even examined her breasts. It was ordered as screening.  Maybe she feels lumps

## 2021-03-26 NOTE — TELEPHONE ENCOUNTER
Noted. Denisse Gasca stated OK to place order in computer and no need to return call. Provided courtesy call to mammogram and left message for priyanka that order has been changed. Left call back number if she has further questions.

## 2021-03-26 NOTE — TELEPHONE ENCOUNTER
[facility] requesting an new order due to patient having pain with right breast.

## 2021-03-27 ENCOUNTER — IMMUNIZATION (OUTPATIENT)
Dept: LAB | Age: 46
End: 2021-03-27
Attending: HOSPITALIST
Payer: COMMERCIAL

## 2021-03-27 DIAGNOSIS — Z23 NEED FOR VACCINATION: Primary | ICD-10-CM

## 2021-03-27 PROCEDURE — 0001A SARSCOV2 VAC 30MCG/0.3ML IM: CPT

## 2021-03-31 ENCOUNTER — PATIENT MESSAGE (OUTPATIENT)
Dept: FAMILY MEDICINE CLINIC | Facility: CLINIC | Age: 46
End: 2021-03-31

## 2021-03-31 DIAGNOSIS — E78.00 HYPERCHOLESTEREMIA: Primary | ICD-10-CM

## 2021-03-31 RX ORDER — ROSUVASTATIN CALCIUM 5 MG/1
5 TABLET, COATED ORAL NIGHTLY
Qty: 90 TABLET | Refills: 1 | Status: SHIPPED | OUTPATIENT
Start: 2021-03-31 | End: 2021-10-08

## 2021-03-31 NOTE — TELEPHONE ENCOUNTER
From: Tom Boyce  To: Kavon Sanchez MD  Sent: 3/31/2021 5:02 AM CDT  Subject: Prescription Question    Hi Dr. Osvaldo Villatoro,    Yes, I would like to try the meds to help with my Cholesterol.  Please let me know when the prescription will be available for [facility]

## 2021-04-17 ENCOUNTER — IMMUNIZATION (OUTPATIENT)
Dept: LAB | Age: 46
End: 2021-04-17
Attending: HOSPITALIST
Payer: COMMERCIAL

## 2021-04-17 DIAGNOSIS — Z23 NEED FOR VACCINATION: Primary | ICD-10-CM

## 2021-04-17 PROCEDURE — 0002A SARSCOV2 VAC 30MCG/0.3ML IM: CPT

## 2021-04-17 RX ORDER — TRAZODONE HYDROCHLORIDE 50 MG/1
50 TABLET ORAL NIGHTLY PRN
Qty: 90 TABLET | Refills: 0 | Status: SHIPPED | OUTPATIENT
Start: 2021-04-17 | End: 2023-02-15

## 2021-06-23 ENCOUNTER — PATIENT MESSAGE (OUTPATIENT)
Dept: FAMILY MEDICINE CLINIC | Facility: CLINIC | Age: 46
End: 2021-06-23

## 2021-06-24 NOTE — TELEPHONE ENCOUNTER
From: Vaughn Apodaca  To: Matteo Freeman MD  Sent: 6/23/2021 9:45 PM CDT  Subject: Other    Hi Dr. Ramona Avila,    I feel a lump I'm my breast can you tell me which facility I need to visit for a mammogram appointment?     Thank you,  Roderick Danielson

## 2021-06-24 NOTE — TELEPHONE ENCOUNTER
mammo order on file from 03/26/21 with dx code of lump of right breast.  Number to call for mammo appt provided. Advised if new lump concern, to call our office to further discuss.

## 2021-09-29 ENCOUNTER — HOSPITAL ENCOUNTER (OUTPATIENT)
Dept: MAMMOGRAPHY | Facility: HOSPITAL | Age: 46
Discharge: HOME | End: 2021-09-29
Attending: NURSE PRACTITIONER
Payer: COMMERCIAL

## 2021-09-29 ENCOUNTER — HOSPITAL ENCOUNTER (OUTPATIENT)
Dept: ULTRASOUND IMAGING | Facility: HOSPITAL | Age: 46
Discharge: HOME | End: 2021-09-29
Attending: NURSE PRACTITIONER
Payer: COMMERCIAL

## 2021-09-29 DIAGNOSIS — N63.20 LUMP OF LEFT BREAST: ICD-10-CM

## 2021-09-29 DIAGNOSIS — N63.10 LUMP OF RIGHT BREAST: ICD-10-CM

## 2021-09-29 PROCEDURE — 76642 ULTRASOUND BREAST LIMITED: CPT | Performed by: NURSE PRACTITIONER

## 2021-09-29 PROCEDURE — 77062 BREAST TOMOSYNTHESIS BI: CPT | Performed by: NURSE PRACTITIONER

## 2021-09-29 PROCEDURE — 77066 DX MAMMO INCL CAD BI: CPT | Performed by: NURSE PRACTITIONER

## 2021-10-02 ENCOUNTER — LAB ENCOUNTER (OUTPATIENT)
Dept: LAB | Age: 46
End: 2021-10-02
Attending: FAMILY MEDICINE
Payer: COMMERCIAL

## 2021-10-02 DIAGNOSIS — E78.00 HYPERCHOLESTEREMIA: ICD-10-CM

## 2021-10-02 PROCEDURE — 80061 LIPID PANEL: CPT

## 2021-10-02 PROCEDURE — 80076 HEPATIC FUNCTION PANEL: CPT

## 2021-10-02 PROCEDURE — 36415 COLL VENOUS BLD VENIPUNCTURE: CPT

## 2021-10-07 NOTE — PROGRESS NOTES
Eugenio Colby - I would like to increase the statin to 10 mg as it did not improve your numbers much with 5 mg.  Please let me know if ok with that and I will send it in. - Dr. Criss Riley

## 2022-05-26 ENCOUNTER — HOSPITAL ENCOUNTER (OUTPATIENT)
Age: 47
Discharge: HOME | End: 2022-05-26
Payer: COMMERCIAL

## 2022-05-26 VITALS
HEART RATE: 88 BPM | DIASTOLIC BLOOD PRESSURE: 80 MMHG | TEMPERATURE: 97 F | SYSTOLIC BLOOD PRESSURE: 125 MMHG | BODY MASS INDEX: 44.39 KG/M2 | RESPIRATION RATE: 18 BRPM | OXYGEN SATURATION: 100 % | HEIGHT: 64 IN | WEIGHT: 260 LBS

## 2022-05-26 DIAGNOSIS — Z20.822 ENCOUNTER FOR LABORATORY TESTING FOR COVID-19 VIRUS: ICD-10-CM

## 2022-05-26 DIAGNOSIS — J06.9 VIRAL UPPER RESPIRATORY TRACT INFECTION: Primary | ICD-10-CM

## 2022-05-26 DIAGNOSIS — R11.0 NAUSEA: ICD-10-CM

## 2022-05-26 LAB
S PYO AG THROAT QL: NEGATIVE
SARS-COV-2 RNA RESP QL NAA+PROBE: NOT DETECTED

## 2022-05-26 PROCEDURE — U0002 COVID-19 LAB TEST NON-CDC: HCPCS | Performed by: PHYSICIAN ASSISTANT

## 2022-05-26 RX ORDER — TRIAMCINOLONE ACETONIDE 55 UG/1
1 SPRAY, METERED NASAL 2 TIMES DAILY
Qty: 10.8 ML | Refills: 0 | Status: SHIPPED | OUTPATIENT
Start: 2022-05-26

## 2022-05-26 RX ORDER — ONDANSETRON 4 MG/1
4 TABLET, ORALLY DISINTEGRATING ORAL EVERY 4 HOURS PRN
Qty: 10 TABLET | Refills: 0 | Status: SHIPPED | OUTPATIENT
Start: 2022-05-26 | End: 2022-06-02

## 2022-06-03 ENCOUNTER — APPOINTMENT (OUTPATIENT)
Dept: GENERAL RADIOLOGY | Facility: HOSPITAL | Age: 47
End: 2022-06-03
Attending: EMERGENCY MEDICINE
Payer: COMMERCIAL

## 2022-06-03 ENCOUNTER — APPOINTMENT (OUTPATIENT)
Dept: CT IMAGING | Facility: HOSPITAL | Age: 47
End: 2022-06-03
Attending: EMERGENCY MEDICINE
Payer: COMMERCIAL

## 2022-06-03 ENCOUNTER — HOSPITAL ENCOUNTER (EMERGENCY)
Facility: HOSPITAL | Age: 47
Discharge: HOME | End: 2022-06-03
Attending: EMERGENCY MEDICINE
Payer: COMMERCIAL

## 2022-06-03 VITALS
BODY MASS INDEX: 27.66 KG/M2 | SYSTOLIC BLOOD PRESSURE: 124 MMHG | TEMPERATURE: 98 F | DIASTOLIC BLOOD PRESSURE: 80 MMHG | RESPIRATION RATE: 18 BRPM | WEIGHT: 162 LBS | OXYGEN SATURATION: 99 % | HEIGHT: 64 IN | HEART RATE: 67 BPM

## 2022-06-03 DIAGNOSIS — J06.9 VIRAL UPPER RESPIRATORY TRACT INFECTION: Primary | ICD-10-CM

## 2022-06-03 DIAGNOSIS — J01.90 ACUTE SINUSITIS, RECURRENCE NOT SPECIFIED, UNSPECIFIED LOCATION: ICD-10-CM

## 2022-06-03 DIAGNOSIS — J98.01 BRONCHOSPASM: ICD-10-CM

## 2022-06-03 LAB
ANION GAP SERPL CALC-SCNC: 5 MMOL/L (ref 0–18)
BASOPHILS # BLD AUTO: 0.04 X10(3) UL (ref 0–0.2)
BASOPHILS NFR BLD AUTO: 0.4 %
BUN BLD-MCNC: 17 MG/DL (ref 7–18)
BUN/CREAT SERPL: 23 (ref 10–20)
CALCIUM BLD-MCNC: 9.1 MG/DL (ref 8.5–10.1)
CHLORIDE SERPL-SCNC: 109 MMOL/L (ref 98–112)
CO2 SERPL-SCNC: 27 MMOL/L (ref 21–32)
CREAT BLD-MCNC: 0.74 MG/DL
DEPRECATED RDW RBC AUTO: 44.3 FL (ref 35.1–46.3)
EOSINOPHIL # BLD AUTO: 0.14 X10(3) UL (ref 0–0.7)
EOSINOPHIL NFR BLD AUTO: 1.4 %
ERYTHROCYTE [DISTWIDTH] IN BLOOD BY AUTOMATED COUNT: 13.6 % (ref 11–15)
GLUCOSE BLD-MCNC: 97 MG/DL (ref 70–99)
HCT VFR BLD AUTO: 39.3 %
HGB BLD-MCNC: 11.9 G/DL
IMM GRANULOCYTES # BLD AUTO: 0.04 X10(3) UL (ref 0–1)
IMM GRANULOCYTES NFR BLD: 0.4 %
LYMPHOCYTES # BLD AUTO: 2.11 X10(3) UL (ref 1–4)
LYMPHOCYTES NFR BLD AUTO: 20.7 %
MCH RBC QN AUTO: 27.2 PG (ref 26–34)
MCHC RBC AUTO-ENTMCNC: 30.3 G/DL (ref 31–37)
MCV RBC AUTO: 89.9 FL
MONOCYTES # BLD AUTO: 0.61 X10(3) UL (ref 0.1–1)
MONOCYTES NFR BLD AUTO: 6 %
NEUTROPHILS # BLD AUTO: 7.25 X10 (3) UL (ref 1.5–7.7)
NEUTROPHILS # BLD AUTO: 7.25 X10(3) UL (ref 1.5–7.7)
NEUTROPHILS NFR BLD AUTO: 71.1 %
OSMOLALITY SERPL CALC.SUM OF ELEC: 293 MOSM/KG (ref 275–295)
PLATELET # BLD AUTO: 321 10(3)UL (ref 150–450)
POTASSIUM SERPL-SCNC: 4.1 MMOL/L (ref 3.5–5.1)
RBC # BLD AUTO: 4.37 X10(6)UL
SODIUM SERPL-SCNC: 141 MMOL/L (ref 136–145)
WBC # BLD AUTO: 10.2 X10(3) UL (ref 4–11)

## 2022-06-03 PROCEDURE — 85025 COMPLETE CBC W/AUTO DIFF WBC: CPT | Performed by: EMERGENCY MEDICINE

## 2022-06-03 PROCEDURE — 94640 AIRWAY INHALATION TREATMENT: CPT

## 2022-06-03 PROCEDURE — 96375 TX/PRO/DX INJ NEW DRUG ADDON: CPT

## 2022-06-03 PROCEDURE — 99285 EMERGENCY DEPT VISIT HI MDM: CPT

## 2022-06-03 PROCEDURE — 80048 BASIC METABOLIC PNL TOTAL CA: CPT | Performed by: EMERGENCY MEDICINE

## 2022-06-03 PROCEDURE — 71045 X-RAY EXAM CHEST 1 VIEW: CPT | Performed by: EMERGENCY MEDICINE

## 2022-06-03 PROCEDURE — 70450 CT HEAD/BRAIN W/O DYE: CPT | Performed by: EMERGENCY MEDICINE

## 2022-06-03 PROCEDURE — 96374 THER/PROPH/DIAG INJ IV PUSH: CPT

## 2022-06-03 RX ORDER — METOCLOPRAMIDE HYDROCHLORIDE 5 MG/ML
10 INJECTION INTRAMUSCULAR; INTRAVENOUS ONCE
Status: COMPLETED | OUTPATIENT
Start: 2022-06-03 | End: 2022-06-03

## 2022-06-03 RX ORDER — KETOROLAC TROMETHAMINE 15 MG/ML
15 INJECTION, SOLUTION INTRAMUSCULAR; INTRAVENOUS ONCE
Status: COMPLETED | OUTPATIENT
Start: 2022-06-03 | End: 2022-06-03

## 2022-06-03 RX ORDER — IPRATROPIUM BROMIDE AND ALBUTEROL SULFATE 2.5; .5 MG/3ML; MG/3ML
3 SOLUTION RESPIRATORY (INHALATION) ONCE
Status: COMPLETED | OUTPATIENT
Start: 2022-06-03 | End: 2022-06-03

## 2022-06-03 RX ORDER — MELOXICAM 7.5 MG/1
7.5 TABLET ORAL DAILY PRN
Qty: 14 TABLET | Refills: 0 | Status: SHIPPED | OUTPATIENT
Start: 2022-06-03 | End: 2022-06-17

## 2022-06-03 RX ORDER — ALBUTEROL SULFATE 90 UG/1
2 AEROSOL, METERED RESPIRATORY (INHALATION) EVERY 4 HOURS PRN
Qty: 1 EACH | Refills: 0 | Status: SHIPPED | OUTPATIENT
Start: 2022-06-03 | End: 2022-07-03

## 2022-06-03 RX ORDER — AMOXICILLIN AND CLAVULANATE POTASSIUM 875; 125 MG/1; MG/1
1 TABLET, FILM COATED ORAL 2 TIMES DAILY
Qty: 10 TABLET | Refills: 0 | Status: SHIPPED | OUTPATIENT
Start: 2022-06-03 | End: 2022-06-08

## 2022-06-03 RX ORDER — DEXAMETHASONE SODIUM PHOSPHATE 10 MG/ML
10 INJECTION, SOLUTION INTRAMUSCULAR; INTRAVENOUS ONCE
Status: COMPLETED | OUTPATIENT
Start: 2022-06-03 | End: 2022-06-03

## 2022-06-03 RX ORDER — METOCLOPRAMIDE 10 MG/1
10 TABLET ORAL EVERY 6 HOURS PRN
Qty: 20 TABLET | Refills: 0 | Status: SHIPPED | OUTPATIENT
Start: 2022-06-03 | End: 2022-06-08

## 2022-06-03 RX ORDER — FLUTICASONE PROPIONATE 50 MCG
2 SPRAY, SUSPENSION (ML) NASAL DAILY
Qty: 16 G | Refills: 0 | Status: SHIPPED | OUTPATIENT
Start: 2022-06-03 | End: 2022-07-03

## 2022-06-03 RX ORDER — DIPHENHYDRAMINE HYDROCHLORIDE 50 MG/ML
25 INJECTION INTRAMUSCULAR; INTRAVENOUS ONCE
Status: COMPLETED | OUTPATIENT
Start: 2022-06-03 | End: 2022-06-03

## 2022-06-03 NOTE — ED INITIAL ASSESSMENT (HPI)
Pt presents to the ER with c/o HA, right ear pain, and congestion x 1 week    Went to IC last week and was diagnosed with sinus infection. Pt stated she is \"not getting any better. \"    Pt took \"Goody's powder\" at 7:15am (tylenol 325 mg, ASA 500mg, Caffeine 65 mg)

## 2022-06-03 NOTE — ED QUICK NOTES
PATIENT APPROVED FOR DISCHARGE PER ER PROVIDER. PATIENT GIVEN VERBAL AND WRITTEN DISCHARGE INSTRUCTIONS. GIVEN INSTRUCTIONS ON RX X 5, FOLLOW UP WITH PCP AND SYMPTOMS THAT NECESSITATE COMING BACK TO ED. VERBALIZES UNDERSTANDING OF DISCHARGE INSTRUCTIONS. PATIENT AOX3 OUT OF ED WITH STEADY GAIT. RESP UNLABORED.

## 2022-06-03 NOTE — ED QUICK NOTES
Patient presents with:  Headache    PATIENT AOX3 TO ED VIA PRIVATE VEHICLE CO OF COUGH X 1.5 WEEKS WITH FRONTAL HEADACHE X 1 WEEK. PATIENT DENIES N/V/D DENIES FEVER. COUGH DRY, NON PRODUCTIVE     PATIENT CHANGED INTO GOWN ON NIBP AND SPO2 MONITORS.  SIDE RAILSX2 CALL LIGHT WITHIN REACH BLANKET PROVIDED

## 2022-10-27 ENCOUNTER — MED REC SCAN ONLY (OUTPATIENT)
Dept: FAMILY MEDICINE CLINIC | Facility: CLINIC | Age: 47
End: 2022-10-27

## 2022-12-01 ENCOUNTER — TELEPHONE (OUTPATIENT)
Dept: FAMILY MEDICINE CLINIC | Facility: CLINIC | Age: 47
End: 2022-12-01

## 2022-12-01 DIAGNOSIS — Z12.31 ENCOUNTER FOR SCREENING MAMMOGRAM FOR MALIGNANT NEOPLASM OF BREAST: Primary | ICD-10-CM

## 2022-12-02 NOTE — TELEPHONE ENCOUNTER
Last mammogram was on 9/29/2021 diagnostic and ultrasound due to lump- normal. Last mammogram screening was on 3/16/2019-normal. Last office visit with Dr Marivel Jolly 3/16/2021. Order pended since does not meet protocol. Has a future appointment on 2/15/2023.

## 2023-01-10 ENCOUNTER — HOSPITAL ENCOUNTER (OUTPATIENT)
Dept: MAMMOGRAPHY | Age: 48
Discharge: HOME | End: 2023-01-10
Attending: FAMILY MEDICINE
Payer: COMMERCIAL

## 2023-01-10 DIAGNOSIS — Z12.31 ENCOUNTER FOR SCREENING MAMMOGRAM FOR MALIGNANT NEOPLASM OF BREAST: ICD-10-CM

## 2023-01-10 PROCEDURE — 77063 BREAST TOMOSYNTHESIS BI: CPT | Performed by: FAMILY MEDICINE

## 2023-01-10 PROCEDURE — 77067 SCR MAMMO BI INCL CAD: CPT | Performed by: FAMILY MEDICINE

## 2023-02-15 ENCOUNTER — LAB ENCOUNTER (OUTPATIENT)
Dept: LAB | Age: 48
End: 2023-02-15
Attending: FAMILY MEDICINE
Payer: COMMERCIAL

## 2023-02-15 ENCOUNTER — OFFICE VISIT (OUTPATIENT)
Dept: FAMILY MEDICINE CLINIC | Facility: CLINIC | Age: 48
End: 2023-02-15

## 2023-02-15 VITALS
BODY MASS INDEX: 45.82 KG/M2 | DIASTOLIC BLOOD PRESSURE: 68 MMHG | TEMPERATURE: 98 F | HEIGHT: 64 IN | SYSTOLIC BLOOD PRESSURE: 103 MMHG | WEIGHT: 268.38 LBS | HEART RATE: 82 BPM

## 2023-02-15 DIAGNOSIS — Z12.31 SCREENING MAMMOGRAM, ENCOUNTER FOR: ICD-10-CM

## 2023-02-15 DIAGNOSIS — Z00.00 ROUTINE MEDICAL EXAM: Primary | ICD-10-CM

## 2023-02-15 DIAGNOSIS — E55.9 VITAMIN D DEFICIENCY: ICD-10-CM

## 2023-02-15 DIAGNOSIS — G47.00 INSOMNIA, UNSPECIFIED TYPE: ICD-10-CM

## 2023-02-15 DIAGNOSIS — M79.89 SWELLING OF EXTREMITY: ICD-10-CM

## 2023-02-15 DIAGNOSIS — Z00.00 ROUTINE MEDICAL EXAM: ICD-10-CM

## 2023-02-15 DIAGNOSIS — E78.00 HYPERCHOLESTEREMIA: ICD-10-CM

## 2023-02-15 LAB
ALBUMIN SERPL-MCNC: 3.7 G/DL (ref 3.4–5)
ALBUMIN/GLOB SERPL: 0.9 {RATIO} (ref 1–2)
ALP LIVER SERPL-CCNC: 94 U/L
ALT SERPL-CCNC: 14 U/L
ANION GAP SERPL CALC-SCNC: 5 MMOL/L (ref 0–18)
AST SERPL-CCNC: 10 U/L (ref 15–37)
BASOPHILS # BLD AUTO: 0.05 X10(3) UL (ref 0–0.2)
BASOPHILS NFR BLD AUTO: 0.7 %
BILIRUB SERPL-MCNC: 0.3 MG/DL (ref 0.1–2)
BILIRUB UR QL: NEGATIVE
BUN BLD-MCNC: 19 MG/DL (ref 7–18)
BUN/CREAT SERPL: 22.9 (ref 10–20)
CALCIUM BLD-MCNC: 9.4 MG/DL (ref 8.5–10.1)
CHLORIDE SERPL-SCNC: 108 MMOL/L (ref 98–112)
CHOLEST SERPL-MCNC: 211 MG/DL (ref ?–200)
CO2 SERPL-SCNC: 30 MMOL/L (ref 21–32)
COLOR UR: YELLOW
CREAT BLD-MCNC: 0.83 MG/DL
DEPRECATED RDW RBC AUTO: 44.8 FL (ref 35.1–46.3)
EOSINOPHIL # BLD AUTO: 0.28 X10(3) UL (ref 0–0.7)
EOSINOPHIL NFR BLD AUTO: 4.1 %
ERYTHROCYTE [DISTWIDTH] IN BLOOD BY AUTOMATED COUNT: 13.5 % (ref 11–15)
FASTING PATIENT LIPID ANSWER: YES
FASTING STATUS PATIENT QL REPORTED: YES
GFR SERPLBLD BASED ON 1.73 SQ M-ARVRAT: 87 ML/MIN/1.73M2 (ref 60–?)
GLOBULIN PLAS-MCNC: 4 G/DL (ref 2.8–4.4)
GLUCOSE BLD-MCNC: 97 MG/DL (ref 70–99)
GLUCOSE UR-MCNC: NEGATIVE MG/DL
HCT VFR BLD AUTO: 38.7 %
HDLC SERPL-MCNC: 66 MG/DL (ref 40–59)
HGB BLD-MCNC: 12 G/DL
IMM GRANULOCYTES # BLD AUTO: 0.01 X10(3) UL (ref 0–1)
IMM GRANULOCYTES NFR BLD: 0.1 %
KETONES UR-MCNC: NEGATIVE MG/DL
LDLC SERPL CALC-MCNC: 132 MG/DL (ref ?–100)
LYMPHOCYTES # BLD AUTO: 2.2 X10(3) UL (ref 1–4)
LYMPHOCYTES NFR BLD AUTO: 32.6 %
MCH RBC QN AUTO: 28 PG (ref 26–34)
MCHC RBC AUTO-ENTMCNC: 31 G/DL (ref 31–37)
MCV RBC AUTO: 90.4 FL
MONOCYTES # BLD AUTO: 0.4 X10(3) UL (ref 0.1–1)
MONOCYTES NFR BLD AUTO: 5.9 %
NEUTROPHILS # BLD AUTO: 3.81 X10 (3) UL (ref 1.5–7.7)
NEUTROPHILS # BLD AUTO: 3.81 X10(3) UL (ref 1.5–7.7)
NEUTROPHILS NFR BLD AUTO: 56.6 %
NITRITE UR QL STRIP.AUTO: NEGATIVE
NONHDLC SERPL-MCNC: 145 MG/DL (ref ?–130)
OSMOLALITY SERPL CALC.SUM OF ELEC: 298 MOSM/KG (ref 275–295)
PH UR: 5 [PH] (ref 5–8)
PLATELET # BLD AUTO: 301 10(3)UL (ref 150–450)
POTASSIUM SERPL-SCNC: 4.6 MMOL/L (ref 3.5–5.1)
PROT SERPL-MCNC: 7.7 G/DL (ref 6.4–8.2)
PROT UR-MCNC: NEGATIVE MG/DL
RBC # BLD AUTO: 4.28 X10(6)UL
SODIUM SERPL-SCNC: 143 MMOL/L (ref 136–145)
SP GR UR STRIP: 1.02 (ref 1–1.03)
TRIGL SERPL-MCNC: 72 MG/DL (ref 30–149)
TSI SER-ACNC: 1.3 MIU/ML (ref 0.36–3.74)
UROBILINOGEN UR STRIP-ACNC: <2
VIT B12 SERPL-MCNC: 476 PG/ML (ref 193–986)
VIT C UR-MCNC: NEGATIVE MG/DL
VIT D+METAB SERPL-MCNC: 9.2 NG/ML (ref 30–100)
VLDLC SERPL CALC-MCNC: 13 MG/DL (ref 0–30)
WBC # BLD AUTO: 6.8 X10(3) UL (ref 4–11)

## 2023-02-15 PROCEDURE — 3074F SYST BP LT 130 MM HG: CPT | Performed by: FAMILY MEDICINE

## 2023-02-15 PROCEDURE — 99396 PREV VISIT EST AGE 40-64: CPT | Performed by: FAMILY MEDICINE

## 2023-02-15 PROCEDURE — 85025 COMPLETE CBC W/AUTO DIFF WBC: CPT

## 2023-02-15 PROCEDURE — 82607 VITAMIN B-12: CPT

## 2023-02-15 PROCEDURE — 81001 URINALYSIS AUTO W/SCOPE: CPT

## 2023-02-15 PROCEDURE — 3078F DIAST BP <80 MM HG: CPT | Performed by: FAMILY MEDICINE

## 2023-02-15 PROCEDURE — 3008F BODY MASS INDEX DOCD: CPT | Performed by: FAMILY MEDICINE

## 2023-02-15 PROCEDURE — 36415 COLL VENOUS BLD VENIPUNCTURE: CPT

## 2023-02-15 PROCEDURE — 82306 VITAMIN D 25 HYDROXY: CPT

## 2023-02-15 PROCEDURE — 84443 ASSAY THYROID STIM HORMONE: CPT

## 2023-02-15 PROCEDURE — 80053 COMPREHEN METABOLIC PANEL: CPT

## 2023-02-15 PROCEDURE — 80061 LIPID PANEL: CPT

## 2023-02-15 RX ORDER — TRAZODONE HYDROCHLORIDE 50 MG/1
50 TABLET ORAL NIGHTLY PRN
Qty: 90 TABLET | Refills: 0 | COMMUNITY
Start: 2023-02-15

## 2023-02-15 RX ORDER — HYDROCHLOROTHIAZIDE 12.5 MG/1
12.5 TABLET ORAL DAILY
Qty: 90 TABLET | Refills: 1 | Status: SHIPPED | OUTPATIENT
Start: 2023-02-15 | End: 2024-02-10

## 2023-02-15 RX ORDER — CHOLECALCIFEROL (VITAMIN D3) 1250 MCG
1 CAPSULE ORAL WEEKLY
Qty: 12 CAPSULE | Refills: 4 | Status: SHIPPED | OUTPATIENT
Start: 2023-02-15

## 2023-02-15 RX ORDER — ROSUVASTATIN CALCIUM 10 MG/1
10 TABLET, COATED ORAL NIGHTLY
Qty: 90 TABLET | Refills: 4 | Status: SHIPPED | OUTPATIENT
Start: 2023-02-15

## 2023-02-15 NOTE — PROGRESS NOTES
Eugenio Colby - Please restart your weekly high dose vitamin D - levels are extremely low. Cholesterol is still elevated so restart your medications. You do have small amount of blood in your urine - as I recall, you said you no longer have a period.  Just want to confirm that. - Dr. Edil Osei

## 2023-02-21 ENCOUNTER — HOSPITAL ENCOUNTER (OUTPATIENT)
Dept: ULTRASOUND IMAGING | Facility: HOSPITAL | Age: 48
Discharge: HOME | End: 2023-02-21
Attending: FAMILY MEDICINE
Payer: COMMERCIAL

## 2023-02-21 DIAGNOSIS — R31.9 HEMATURIA, UNSPECIFIED TYPE: ICD-10-CM

## 2023-02-21 PROCEDURE — 76775 US EXAM ABDO BACK WALL LIM: CPT | Performed by: FAMILY MEDICINE

## 2023-02-22 ENCOUNTER — NURSE ONLY (OUTPATIENT)
Facility: CLINIC | Age: 48
End: 2023-02-22

## 2023-02-22 DIAGNOSIS — Z12.11 COLON CANCER SCREENING: Primary | ICD-10-CM

## 2023-02-22 DIAGNOSIS — K21.9 GASTROESOPHAGEAL REFLUX DISEASE, UNSPECIFIED WHETHER ESOPHAGITIS PRESENT: ICD-10-CM

## 2023-02-22 RX ORDER — SODIUM, POTASSIUM,MAG SULFATES 17.5-3.13G
SOLUTION, RECONSTITUTED, ORAL ORAL
Qty: 1 EACH | Refills: 0 | Status: SHIPPED | OUTPATIENT
Start: 2023-02-22

## 2023-02-22 NOTE — PROGRESS NOTES
Dx: average risk crc screening  Colonoscopy with MAC sedation  Split dose suprep, if suprep not covered then golytely or moviprep, sent to pharmacy  Please review prep instructions with patient    - HOLD diabetic medications the night before and/or the day of the procedure - If the patient is on insulin, please have the PCP or endocrinologist assist with management of dosing prior to the procedure.  - NO herbal supplements or weight loss medications x 7 days prior to the procedure.

## 2023-02-22 NOTE — PROGRESS NOTES
Patient reviewed test results through [facility]. No further action required at this time.  Seen by patient Genetvictor hugo Gosia on 2/21/2023  7:31 PM

## 2023-02-23 NOTE — PROGRESS NOTES
Hi Dr. Yaritza Moreno,     Spoke with patient Zack Aldana to schedule CLN-    She is now requesting an EGD  r/t frequent heartburn/ GERD.      Please advise on orders, if appropriate-     Thank you

## 2023-02-23 NOTE — PROGRESS NOTES
Prior to me scheduling an EGD I will need to see her in the office. GI staff:    -Okay to schedule for an EGD and colonoscopy but it will have to be at the hospital. (end of may/Natty). -Prior to the scheduled procedures though I need to see her in clinic. Can we please add to my clinic schedule some time in April to discuss symptoms of GERD. Thank you.

## 2023-04-04 ENCOUNTER — OFFICE VISIT (OUTPATIENT)
Facility: CLINIC | Age: 48
End: 2023-04-04

## 2023-04-04 VITALS
WEIGHT: 274 LBS | HEART RATE: 81 BPM | BODY MASS INDEX: 46.78 KG/M2 | SYSTOLIC BLOOD PRESSURE: 110 MMHG | DIASTOLIC BLOOD PRESSURE: 73 MMHG | HEIGHT: 64 IN

## 2023-04-04 DIAGNOSIS — K21.9 GASTROESOPHAGEAL REFLUX DISEASE WITHOUT ESOPHAGITIS: Primary | ICD-10-CM

## 2023-04-04 DIAGNOSIS — Z12.11 SCREEN FOR COLON CANCER: ICD-10-CM

## 2023-04-04 PROCEDURE — 3078F DIAST BP <80 MM HG: CPT | Performed by: STUDENT IN AN ORGANIZED HEALTH CARE EDUCATION/TRAINING PROGRAM

## 2023-04-04 PROCEDURE — 3074F SYST BP LT 130 MM HG: CPT | Performed by: STUDENT IN AN ORGANIZED HEALTH CARE EDUCATION/TRAINING PROGRAM

## 2023-04-04 PROCEDURE — S0285 CNSLT BEFORE SCREEN COLONOSC: HCPCS | Performed by: STUDENT IN AN ORGANIZED HEALTH CARE EDUCATION/TRAINING PROGRAM

## 2023-04-04 PROCEDURE — 3008F BODY MASS INDEX DOCD: CPT | Performed by: STUDENT IN AN ORGANIZED HEALTH CARE EDUCATION/TRAINING PROGRAM

## 2023-04-04 RX ORDER — OMEPRAZOLE 40 MG/1
40 CAPSULE, DELAYED RELEASE ORAL
Qty: 90 CAPSULE | Refills: 0 | Status: SHIPPED | OUTPATIENT
Start: 2023-04-04 | End: 2023-07-03

## 2023-05-01 ENCOUNTER — PATIENT MESSAGE (OUTPATIENT)
Dept: FAMILY MEDICINE CLINIC | Facility: CLINIC | Age: 48
End: 2023-05-01

## 2023-05-03 ENCOUNTER — TELEPHONE (OUTPATIENT)
Dept: FAMILY MEDICINE CLINIC | Facility: CLINIC | Age: 48
End: 2023-05-03

## 2023-05-03 NOTE — TELEPHONE ENCOUNTER
Should follow up with Dr. Jerry Lanier. Actually behind on follow up with him for abnormal pap in past per records - 2019 last one.

## 2023-06-02 ENCOUNTER — HOSPITAL ENCOUNTER (OUTPATIENT)
Facility: HOSPITAL | Age: 48
Setting detail: HOSPITAL OUTPATIENT SURGERY
Discharge: HOME | End: 2023-06-02
Attending: STUDENT IN AN ORGANIZED HEALTH CARE EDUCATION/TRAINING PROGRAM | Admitting: STUDENT IN AN ORGANIZED HEALTH CARE EDUCATION/TRAINING PROGRAM
Payer: COMMERCIAL

## 2023-06-02 ENCOUNTER — ANESTHESIA (OUTPATIENT)
Dept: ENDOSCOPY | Facility: HOSPITAL | Age: 48
End: 2023-06-02
Payer: COMMERCIAL

## 2023-06-02 ENCOUNTER — ANESTHESIA EVENT (OUTPATIENT)
Dept: ENDOSCOPY | Facility: HOSPITAL | Age: 48
End: 2023-06-02
Payer: COMMERCIAL

## 2023-06-02 VITALS
HEIGHT: 64 IN | BODY MASS INDEX: 46.78 KG/M2 | RESPIRATION RATE: 17 BRPM | SYSTOLIC BLOOD PRESSURE: 118 MMHG | DIASTOLIC BLOOD PRESSURE: 87 MMHG | WEIGHT: 274 LBS | OXYGEN SATURATION: 100 % | HEART RATE: 81 BPM

## 2023-06-02 DIAGNOSIS — K21.9 GASTROESOPHAGEAL REFLUX DISEASE, UNSPECIFIED WHETHER ESOPHAGITIS PRESENT: ICD-10-CM

## 2023-06-02 DIAGNOSIS — Z12.11 COLON CANCER SCREENING: ICD-10-CM

## 2023-06-02 LAB — B-HCG UR QL: NEGATIVE

## 2023-06-02 PROCEDURE — 0DB68ZX EXCISION OF STOMACH, VIA NATURAL OR ARTIFICIAL OPENING ENDOSCOPIC, DIAGNOSTIC: ICD-10-PCS | Performed by: STUDENT IN AN ORGANIZED HEALTH CARE EDUCATION/TRAINING PROGRAM

## 2023-06-02 PROCEDURE — 0DB18ZX EXCISION OF UPPER ESOPHAGUS, VIA NATURAL OR ARTIFICIAL OPENING ENDOSCOPIC, DIAGNOSTIC: ICD-10-PCS | Performed by: STUDENT IN AN ORGANIZED HEALTH CARE EDUCATION/TRAINING PROGRAM

## 2023-06-02 PROCEDURE — 43239 EGD BIOPSY SINGLE/MULTIPLE: CPT | Performed by: STUDENT IN AN ORGANIZED HEALTH CARE EDUCATION/TRAINING PROGRAM

## 2023-06-02 PROCEDURE — 0DB98ZX EXCISION OF DUODENUM, VIA NATURAL OR ARTIFICIAL OPENING ENDOSCOPIC, DIAGNOSTIC: ICD-10-PCS | Performed by: STUDENT IN AN ORGANIZED HEALTH CARE EDUCATION/TRAINING PROGRAM

## 2023-06-02 PROCEDURE — 45385 COLONOSCOPY W/LESION REMOVAL: CPT | Performed by: STUDENT IN AN ORGANIZED HEALTH CARE EDUCATION/TRAINING PROGRAM

## 2023-06-02 PROCEDURE — 0DBL8ZX EXCISION OF TRANSVERSE COLON, VIA NATURAL OR ARTIFICIAL OPENING ENDOSCOPIC, DIAGNOSTIC: ICD-10-PCS | Performed by: STUDENT IN AN ORGANIZED HEALTH CARE EDUCATION/TRAINING PROGRAM

## 2023-06-02 RX ORDER — SODIUM CHLORIDE, SODIUM LACTATE, POTASSIUM CHLORIDE, CALCIUM CHLORIDE 600; 310; 30; 20 MG/100ML; MG/100ML; MG/100ML; MG/100ML
INJECTION, SOLUTION INTRAVENOUS CONTINUOUS
Status: DISCONTINUED | OUTPATIENT
Start: 2023-06-02 | End: 2023-06-02

## 2023-06-02 RX ORDER — LIDOCAINE HYDROCHLORIDE 10 MG/ML
INJECTION, SOLUTION EPIDURAL; INFILTRATION; INTRACAUDAL; PERINEURAL AS NEEDED
Status: DISCONTINUED | OUTPATIENT
Start: 2023-06-02 | End: 2023-06-02 | Stop reason: SURG

## 2023-06-02 RX ADMIN — SODIUM CHLORIDE, SODIUM LACTATE, POTASSIUM CHLORIDE, CALCIUM CHLORIDE: 600; 310; 30; 20 INJECTION, SOLUTION INTRAVENOUS at 14:24:00

## 2023-06-02 RX ADMIN — SODIUM CHLORIDE, SODIUM LACTATE, POTASSIUM CHLORIDE, CALCIUM CHLORIDE: 600; 310; 30; 20 INJECTION, SOLUTION INTRAVENOUS at 13:38:00

## 2023-06-02 RX ADMIN — LIDOCAINE HYDROCHLORIDE 50 MG: 10 INJECTION, SOLUTION EPIDURAL; INFILTRATION; INTRACAUDAL; PERINEURAL at 13:42:00

## 2023-06-02 NOTE — ANESTHESIA POSTPROCEDURE EVALUATION
Patient: Memo Webster    Procedure Summary       Date: 06/02/23 Room / Location: [address] [place] [address] ENDOSCOPY 04 / [address] [place] [address] ENDOSCOPY    Anesthesia Start: 5031 Anesthesia Stop:     Procedures:       COLONOSCOPY-SCREENING/      ESOPHAGOGASTRODUODENOSCOPY (EGD) Diagnosis:       Colon cancer screening      Gastroesophageal reflux disease, unspecified whether esophagitis present      (colon polyp, diverticulosis, normal EGD )    Surgeons: Guilherme Small MD Anesthesiologist: Agusto Francis CRNA    Anesthesia Type: general ASA Status: 3            Anesthesia Type: general    Vitals Value Taken Time   BP 99/49 06/02/23 1422   Temp  06/02/23 1424   Pulse 82 06/02/23 1424   Resp 17 06/02/23 1424   SpO2 100 % 06/02/23 1424   Vitals shown include unvalidated device data.     [address] [place] [address] AN Post Evaluation:   Patient Evaluated in PACU  Patient Participation: complete - patient participated  Level of Consciousness: sleepy but conscious  Pain Score: 0  Pain Management: adequate  Airway Patency:patent  Dental exam unchanged from preop  Yes    Cardiovascular Status: hemodynamically stable  Respiratory Status: room air  Postoperative Hydration acceptable      Alma Edwards CRNA  6/2/2023 2:24 PM

## 2023-06-02 NOTE — DISCHARGE INSTRUCTIONS
Recommend:  Await pathology. High fiber diet. Monitor for blood in the stool. If having more than just tinge of blood, call office or go to the ER. Next steps depend on the biopsy results. Home Care Instructions for Colonoscopy and/or Gastroscopy with Sedation    Diet:  - Resume your regular diet as tolerated unless otherwise instructed. - Start with light meals to minimize bloating.  - Do not drink alcohol today. Medication:  - If you have questions about resuming your normal medications, please contact your Primary Care Physician. Activities:  - Take it easy today. Do not return to work today. - Do not drive today. - Do not operate any machinery today (including kitchen equipment). Colonoscopy:  - You may notice some rectal \"spotting\" (a little blood on the toilet tissue) for a day or two after the exam. This is normal.  - If you experience any rectal bleeding (not spotting), persistent tenderness or sharp severe abdominal pains, oral temperature over 100 degrees Fahrenheit, light-headedness or dizziness, or any other problems, contact your doctor. Gastroscopy:  - You may have a sore throat for 2-3 days following the exam. This is normal. Gargling with warm salt water (1/2 tsp salt to 1 glass warm water) or using throat lozenges will help. - If you experience any sharp pain in your neck, abdomen or chest, vomiting of blood, oral temperature over 100 degrees Fahrenheit, light-headedness or dizziness, or any other problems, contact your doctor. **If unable to reach your doctor, please go to the [facility] Emergency Room**    - Your referring physician will receive a full report of your examination.  - If you do not hear from your doctor's office within two weeks of your biopsy, please call them for your results. You may be able to see your laboratory results in [facility] between 4 and 7 business days.   In some cases, your physician may not have viewed the results before they are released to [address] If you have questions regarding your results contact the physician who ordered the test/exam by phone or via [address] by choosing \"Ask a Medical Question. \"

## 2023-06-06 ENCOUNTER — LAB ENCOUNTER (OUTPATIENT)
Dept: LAB | Facility: HOSPITAL | Age: 48
End: 2023-06-06
Attending: OBSTETRICS & GYNECOLOGY
Payer: COMMERCIAL

## 2023-06-06 ENCOUNTER — OFFICE VISIT (OUTPATIENT)
Dept: OBGYN CLINIC | Facility: CLINIC | Age: 48
End: 2023-06-06

## 2023-06-06 VITALS
DIASTOLIC BLOOD PRESSURE: 72 MMHG | BODY MASS INDEX: 46 KG/M2 | HEART RATE: 83 BPM | WEIGHT: 270.63 LBS | SYSTOLIC BLOOD PRESSURE: 103 MMHG

## 2023-06-06 DIAGNOSIS — N95.0 PMB (POSTMENOPAUSAL BLEEDING): ICD-10-CM

## 2023-06-06 DIAGNOSIS — Z12.4 CERVICAL CANCER SCREENING: ICD-10-CM

## 2023-06-06 DIAGNOSIS — N95.0 PMB (POSTMENOPAUSAL BLEEDING): Primary | ICD-10-CM

## 2023-06-06 LAB
ESTRADIOL SERPL-MCNC: 46.6 PG/ML
FSH SERPL-ACNC: 46.8 MIU/ML
HCG SERPL QL: NEGATIVE
TSI SER-ACNC: 1.32 MIU/ML (ref 0.36–3.74)

## 2023-06-06 PROCEDURE — 3078F DIAST BP <80 MM HG: CPT | Performed by: OBSTETRICS & GYNECOLOGY

## 2023-06-06 PROCEDURE — 84703 CHORIONIC GONADOTROPIN ASSAY: CPT

## 2023-06-06 PROCEDURE — 36415 COLL VENOUS BLD VENIPUNCTURE: CPT

## 2023-06-06 PROCEDURE — 84443 ASSAY THYROID STIM HORMONE: CPT

## 2023-06-06 PROCEDURE — 83001 ASSAY OF GONADOTROPIN (FSH): CPT

## 2023-06-06 PROCEDURE — 82670 ASSAY OF TOTAL ESTRADIOL: CPT

## 2023-06-06 PROCEDURE — 3074F SYST BP LT 130 MM HG: CPT | Performed by: OBSTETRICS & GYNECOLOGY

## 2023-06-06 PROCEDURE — 99203 OFFICE O/P NEW LOW 30 MIN: CPT | Performed by: OBSTETRICS & GYNECOLOGY

## 2023-06-06 NOTE — PROGRESS NOTES
One adenoma removed on recent colonoscopy, needs 7 year follow up. Biopsies of the upper GI tract were negative/normal.    GI Staff:    Can you please place a recall for this patient to have a colonoscopy repeated in 7 years. Thank you.     Neal Crawford MD

## 2023-06-07 ENCOUNTER — TELEPHONE (OUTPATIENT)
Facility: CLINIC | Age: 48
End: 2023-06-07

## 2023-06-07 LAB — HPV I/H RISK 1 DNA SPEC QL NAA+PROBE: POSITIVE

## 2023-06-07 NOTE — TELEPHONE ENCOUNTER
----- Message from Karri Martinez MD sent at 6/6/2023  2:54 PM CDT -----  One adenoma removed on recent colonoscopy, needs 7 year follow up. Biopsies of the upper GI tract were negative/normal.    GI Staff:    Can you please place a recall for this patient to have a colonoscopy repeated in 7 years. Thank you.     Karri Martinez MD

## 2023-06-07 NOTE — TELEPHONE ENCOUNTER
Recall colon in 7 years per Dr Slim Angel.  Colon done 06/02/2023    Health maintenance updated and message sent to pt outreach to repeat colonoscopy in 7 years

## 2023-06-20 LAB
HPV16 DNA CVX QL PROBE+SIG AMP: NEGATIVE
HPV18 DNA CVX QL PROBE+SIG AMP: POSITIVE

## 2023-06-27 ENCOUNTER — TELEPHONE (OUTPATIENT)
Dept: OBGYN CLINIC | Facility: CLINIC | Age: 48
End: 2023-06-27

## 2023-06-28 NOTE — TELEPHONE ENCOUNTER
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 She neesd a colpo. She made a mychart appt for tomorrow for a pap but I already did that. More importantly she was supposed to get an US done. I ordered it last visit but she hasn't scheduled. I want this done to determine if she needs EMB. I can do colpo and EMB at same visit pending US results. She is scheduled at 1pm tomorrow.   Please call philip eldridge in AM to discuss

## 2023-07-07 NOTE — TELEPHONE ENCOUNTER
Pt informed of pap results and need for colpo. Pt also aware that she may need an embx done depending on US results. Per NITHIN, both of these procedures can be done at the same time. US is scheduled on 7/20. Colpo appt scheduled on 8/1. Pt is menopausal so no upt needed. Pt advised to take 600mg of ibuprofen with food 1 hour before appt. Pt aware based on US results, embx may or may not be done.

## 2023-07-20 ENCOUNTER — HOSPITAL ENCOUNTER (OUTPATIENT)
Dept: ULTRASOUND IMAGING | Age: 48
Discharge: HOME | End: 2023-07-20
Attending: OBSTETRICS & GYNECOLOGY
Payer: COMMERCIAL

## 2023-07-20 DIAGNOSIS — N95.0 PMB (POSTMENOPAUSAL BLEEDING): ICD-10-CM

## 2023-07-20 PROCEDURE — 76856 US EXAM PELVIC COMPLETE: CPT | Performed by: OBSTETRICS & GYNECOLOGY

## 2023-07-20 PROCEDURE — 76830 TRANSVAGINAL US NON-OB: CPT | Performed by: OBSTETRICS & GYNECOLOGY

## 2023-07-21 ENCOUNTER — TELEPHONE (OUTPATIENT)
Dept: OBGYN CLINIC | Facility: CLINIC | Age: 48
End: 2023-07-21

## 2023-07-21 NOTE — TELEPHONE ENCOUNTER
----- Message from Hyacinth Fields DO sent at 7/21/2023  7:58 AM CDT -----  Please notify pt of ?polyp vs submucosal fibroid.   I need a video with her to discuss results and plan of care with her

## 2023-07-25 ENCOUNTER — PATIENT MESSAGE (OUTPATIENT)
Dept: OBGYN CLINIC | Facility: CLINIC | Age: 48
End: 2023-07-25

## 2023-07-25 NOTE — TELEPHONE ENCOUNTER
Pt notified of [address] [place] [address] Street and informed of polyp vs submucosal fibroid. Assisted pt in scheduling video visit per [address] on 8/4/23. Pt is scheduled to have colpo/ possible embx on 8/1. Message to [address] to please review and advise. Do you want to still discuss pelvic US results during separate video visit on 8/4 or did you want to discuss during colpo appt on 8/1?

## 2023-07-25 NOTE — TELEPHONE ENCOUNTER
----- Message from Cristal Egan, DO sent at 7/21/2023  7:58 AM CDT -----  Please notify pt of ?polyp vs submucosal fibroid.   I need a video with her to discuss results and plan of care with her

## 2023-07-25 NOTE — TELEPHONE ENCOUNTER
From: Rene Palacios  To: Dyan Kim,   Sent: 7/25/2023 12:38 PM CDT  Subject: Angela Arora,    I had test results posted on my chart on 7/21, Can someone please have Dr. Td Galdamez contact me to explain my results and what the next steps are?     Thank you

## 2023-08-01 ENCOUNTER — OFFICE VISIT (OUTPATIENT)
Dept: OBGYN CLINIC | Facility: CLINIC | Age: 48
End: 2023-08-01

## 2023-08-01 VITALS — DIASTOLIC BLOOD PRESSURE: 81 MMHG | SYSTOLIC BLOOD PRESSURE: 115 MMHG | HEART RATE: 74 BPM

## 2023-08-01 DIAGNOSIS — R87.810 CERVICAL HIGH RISK HUMAN PAPILLOMAVIRUS (HPV) DNA TEST POSITIVE: ICD-10-CM

## 2023-08-01 DIAGNOSIS — N95.0 PMB (POSTMENOPAUSAL BLEEDING): Primary | ICD-10-CM

## 2023-08-01 PROCEDURE — 58110 BX DONE W/COLPOSCOPY ADD-ON: CPT | Performed by: OBSTETRICS & GYNECOLOGY

## 2023-08-01 PROCEDURE — 3074F SYST BP LT 130 MM HG: CPT | Performed by: OBSTETRICS & GYNECOLOGY

## 2023-08-01 PROCEDURE — 3079F DIAST BP 80-89 MM HG: CPT | Performed by: OBSTETRICS & GYNECOLOGY

## 2023-08-01 PROCEDURE — 57454 BX/CURETT OF CERVIX W/SCOPE: CPT | Performed by: OBSTETRICS & GYNECOLOGY

## 2023-08-01 NOTE — PROCEDURES
Colpo with Endometrial Biopsy    Consent signed. Procedure discussed with patient in detail including indication, risk, benefits, alternatives and complications. Indication: HR-HPV+ and PMB with thickened EML    Procedure: The patient was placed in the dorsal lithotomy position. [place] speculum was placed in the vagina. Cervix prepped with: Acetic acid  Under colposcopic examination the transition zone was seen in entirety without need for endocervical speculum. Cervical biopsy performed with cervical biopsy punch at 3 o'clock. Endocervix was curetted using a Kervorkian curette. Next, single tooth tenaculum applied to ant lip of cx. Betadine wash performed. EMB x 2 passes performed. Monsel's solution was applied to cx biopsy site. Specimen sent to pathology. Patient tolerated procedure well. Discharge instructions of pelvic rest & no swimming x one week.       Findings:  Abnormal vessels  Punctuation, fine    Impression:  Low-grade dysplasia      Based on colpo and EMB results, I have a video visit scheduled with Chaitanya Otero to further review US report with ?fibroid vs polyp    We will  future mgmt based on biopsy results and US report

## 2023-08-04 ENCOUNTER — TELEPHONE (OUTPATIENT)
Dept: OBGYN CLINIC | Facility: CLINIC | Age: 48
End: 2023-08-04

## 2023-08-04 ENCOUNTER — TELEMEDICINE (OUTPATIENT)
Dept: OBGYN CLINIC | Facility: CLINIC | Age: 48
End: 2023-08-04

## 2023-08-04 DIAGNOSIS — N95.0 PMB (POSTMENOPAUSAL BLEEDING): Primary | ICD-10-CM

## 2023-08-04 RX ORDER — MISOPROSTOL 200 UG/1
200 TABLET ORAL ONCE
Qty: 1 TABLET | Refills: 0 | Status: SHIPPED | OUTPATIENT
Start: 2023-08-04 | End: 2023-08-18

## 2023-08-04 NOTE — TELEPHONE ENCOUNTER
Possible dates sent via LUMOback. Aware is to choose 3 top dates and send them via LUMOback or can call the office with them.  Advised pt once I have her 3 top dates I will have to coordinate availability with the OR as well and I will call her once I have a date and time for surgery

## 2023-08-04 NOTE — TELEPHONE ENCOUNTER
Spoke to pt.  Aware surgery is scheduled on Friday,08/18/2023 at 5p, provided cytotec instructions    Per [employer], NO PA Needed for surgery      Message sent to RN pool to please send Cytotec Rx, confirmed with pt Teresa in chart is correct pharmacy     Minor case instructions sent via Venda    Delayed staff message sent to RN pool to please place pre-op orders    Entered in book and calender

## 2023-08-18 ENCOUNTER — HOSPITAL ENCOUNTER (OUTPATIENT)
Facility: HOSPITAL | Age: 48
Setting detail: HOSPITAL OUTPATIENT SURGERY
Discharge: HOME | End: 2023-08-18
Attending: OBSTETRICS & GYNECOLOGY | Admitting: OBSTETRICS & GYNECOLOGY
Payer: COMMERCIAL

## 2023-08-18 ENCOUNTER — ANESTHESIA (OUTPATIENT)
Dept: SURGERY | Facility: HOSPITAL | Age: 48
End: 2023-08-18
Payer: COMMERCIAL

## 2023-08-18 ENCOUNTER — ANESTHESIA EVENT (OUTPATIENT)
Dept: SURGERY | Facility: HOSPITAL | Age: 48
End: 2023-08-18
Payer: COMMERCIAL

## 2023-08-18 VITALS
OXYGEN SATURATION: 100 % | TEMPERATURE: 98 F | SYSTOLIC BLOOD PRESSURE: 113 MMHG | WEIGHT: 268 LBS | HEART RATE: 72 BPM | HEIGHT: 64 IN | DIASTOLIC BLOOD PRESSURE: 60 MMHG | RESPIRATION RATE: 14 BRPM | BODY MASS INDEX: 45.75 KG/M2

## 2023-08-18 DIAGNOSIS — N95.0 PMB (POSTMENOPAUSAL BLEEDING): ICD-10-CM

## 2023-08-18 LAB — B-HCG UR QL: NEGATIVE

## 2023-08-18 PROCEDURE — 58120 DILATION AND CURETTAGE: CPT | Performed by: OBSTETRICS & GYNECOLOGY

## 2023-08-18 PROCEDURE — 0UDB8ZX EXTRACTION OF ENDOMETRIUM, VIA NATURAL OR ARTIFICIAL OPENING ENDOSCOPIC, DIAGNOSTIC: ICD-10-PCS | Performed by: OBSTETRICS & GYNECOLOGY

## 2023-08-18 RX ORDER — HYDROCODONE BITARTRATE AND ACETAMINOPHEN 5; 325 MG/1; MG/1
2 TABLET ORAL EVERY 6 HOURS PRN
Status: CANCELLED | OUTPATIENT
Start: 2023-08-18

## 2023-08-18 RX ORDER — MORPHINE SULFATE 10 MG/ML
6 INJECTION, SOLUTION INTRAMUSCULAR; INTRAVENOUS EVERY 10 MIN PRN
Status: DISCONTINUED | OUTPATIENT
Start: 2023-08-18 | End: 2023-08-18

## 2023-08-18 RX ORDER — ONDANSETRON 2 MG/ML
4 INJECTION INTRAMUSCULAR; INTRAVENOUS EVERY 8 HOURS PRN
Status: CANCELLED | OUTPATIENT
Start: 2023-08-18

## 2023-08-18 RX ORDER — ONDANSETRON 4 MG/1
4 TABLET, FILM COATED ORAL EVERY 8 HOURS PRN
Status: CANCELLED | OUTPATIENT
Start: 2023-08-18

## 2023-08-18 RX ORDER — HYDROCODONE BITARTRATE AND ACETAMINOPHEN 5; 325 MG/1; MG/1
1 TABLET ORAL EVERY 6 HOURS PRN
Status: CANCELLED | OUTPATIENT
Start: 2023-08-18

## 2023-08-18 RX ORDER — ACETAMINOPHEN 500 MG
1000 TABLET ORAL ONCE
Status: COMPLETED | OUTPATIENT
Start: 2023-08-18 | End: 2023-08-18

## 2023-08-18 RX ORDER — LIDOCAINE HYDROCHLORIDE 10 MG/ML
INJECTION, SOLUTION EPIDURAL; INFILTRATION; INTRACAUDAL; PERINEURAL AS NEEDED
Status: DISCONTINUED | OUTPATIENT
Start: 2023-08-18 | End: 2023-08-18 | Stop reason: SURG

## 2023-08-18 RX ORDER — HYDROMORPHONE HYDROCHLORIDE 1 MG/ML
0.2 INJECTION, SOLUTION INTRAMUSCULAR; INTRAVENOUS; SUBCUTANEOUS EVERY 5 MIN PRN
Status: DISCONTINUED | OUTPATIENT
Start: 2023-08-18 | End: 2023-08-18

## 2023-08-18 RX ORDER — HYDROMORPHONE HYDROCHLORIDE 1 MG/ML
0.4 INJECTION, SOLUTION INTRAMUSCULAR; INTRAVENOUS; SUBCUTANEOUS EVERY 5 MIN PRN
Status: DISCONTINUED | OUTPATIENT
Start: 2023-08-18 | End: 2023-08-18

## 2023-08-18 RX ORDER — MORPHINE SULFATE 4 MG/ML
2 INJECTION, SOLUTION INTRAMUSCULAR; INTRAVENOUS EVERY 10 MIN PRN
Status: DISCONTINUED | OUTPATIENT
Start: 2023-08-18 | End: 2023-08-18

## 2023-08-18 RX ORDER — SODIUM CHLORIDE, SODIUM LACTATE, POTASSIUM CHLORIDE, CALCIUM CHLORIDE 600; 310; 30; 20 MG/100ML; MG/100ML; MG/100ML; MG/100ML
INJECTION, SOLUTION INTRAVENOUS CONTINUOUS
Status: DISCONTINUED | OUTPATIENT
Start: 2023-08-18 | End: 2023-08-18

## 2023-08-18 RX ORDER — ACETAMINOPHEN 325 MG/1
650 TABLET ORAL EVERY 6 HOURS PRN
Status: CANCELLED | OUTPATIENT
Start: 2023-08-18

## 2023-08-18 RX ORDER — FAMOTIDINE 20 MG/1
20 TABLET, FILM COATED ORAL ONCE
Status: COMPLETED | OUTPATIENT
Start: 2023-08-18 | End: 2023-08-18

## 2023-08-18 RX ORDER — ONDANSETRON 2 MG/ML
INJECTION INTRAMUSCULAR; INTRAVENOUS AS NEEDED
Status: DISCONTINUED | OUTPATIENT
Start: 2023-08-18 | End: 2023-08-18 | Stop reason: SURG

## 2023-08-18 RX ORDER — HYDROMORPHONE HYDROCHLORIDE 1 MG/ML
0.6 INJECTION, SOLUTION INTRAMUSCULAR; INTRAVENOUS; SUBCUTANEOUS EVERY 5 MIN PRN
Status: DISCONTINUED | OUTPATIENT
Start: 2023-08-18 | End: 2023-08-18

## 2023-08-18 RX ORDER — GLYCOPYRROLATE 0.2 MG/ML
INJECTION, SOLUTION INTRAMUSCULAR; INTRAVENOUS AS NEEDED
Status: DISCONTINUED | OUTPATIENT
Start: 2023-08-18 | End: 2023-08-18 | Stop reason: SURG

## 2023-08-18 RX ORDER — KETOROLAC TROMETHAMINE 30 MG/ML
INJECTION, SOLUTION INTRAMUSCULAR; INTRAVENOUS AS NEEDED
Status: DISCONTINUED | OUTPATIENT
Start: 2023-08-18 | End: 2023-08-18 | Stop reason: SURG

## 2023-08-18 RX ORDER — NALOXONE HYDROCHLORIDE 0.4 MG/ML
80 INJECTION, SOLUTION INTRAMUSCULAR; INTRAVENOUS; SUBCUTANEOUS AS NEEDED
Status: DISCONTINUED | OUTPATIENT
Start: 2023-08-18 | End: 2023-08-18

## 2023-08-18 RX ORDER — MORPHINE SULFATE 4 MG/ML
4 INJECTION, SOLUTION INTRAMUSCULAR; INTRAVENOUS EVERY 10 MIN PRN
Status: DISCONTINUED | OUTPATIENT
Start: 2023-08-18 | End: 2023-08-18

## 2023-08-18 RX ORDER — DEXAMETHASONE SODIUM PHOSPHATE 4 MG/ML
VIAL (ML) INJECTION AS NEEDED
Status: DISCONTINUED | OUTPATIENT
Start: 2023-08-18 | End: 2023-08-18 | Stop reason: SURG

## 2023-08-18 RX ORDER — METOCLOPRAMIDE 10 MG/1
10 TABLET ORAL ONCE
Status: COMPLETED | OUTPATIENT
Start: 2023-08-18 | End: 2023-08-18

## 2023-08-18 RX ADMIN — LIDOCAINE HYDROCHLORIDE 25 MG: 10 INJECTION, SOLUTION EPIDURAL; INFILTRATION; INTRACAUDAL; PERINEURAL at 13:09:00

## 2023-08-18 RX ADMIN — KETOROLAC TROMETHAMINE 30 MG: 30 INJECTION, SOLUTION INTRAMUSCULAR; INTRAVENOUS at 13:48:00

## 2023-08-18 RX ADMIN — ONDANSETRON 4 MG: 2 INJECTION INTRAMUSCULAR; INTRAVENOUS at 13:09:00

## 2023-08-18 RX ADMIN — GLYCOPYRROLATE 0.2 MG: 0.2 INJECTION, SOLUTION INTRAMUSCULAR; INTRAVENOUS at 13:09:00

## 2023-08-18 RX ADMIN — DEXAMETHASONE SODIUM PHOSPHATE 4 MG: 4 MG/ML VIAL (ML) INJECTION at 13:09:00

## 2023-08-18 NOTE — OPERATIVE REPORT
Operative Note    Patient Name: Vikas Dai    Preoperative Diagnosis: PMB (postmenopausal bleeding) [N95.0]    Postoperative Diagnosis: PMB (postmenopausal bleeding) [N95.0]    Surgeon(s) and Role:     Rochelle Walker, DO - Primary     Assistant:      Procedures: D&C, Hysteroscopic curettage with truclear device    Surgical Findings: Uterus sound to 8cm, thin vascular endometrium w/o fibroid or polyp    Anesthesia: General    Complications: none    Implants: none    Specimen: Endometrial currettings    Drains: none    Condition: stable to recovery    Estimated Blood Loss: minimal  IVF: 300cc crystalloid  UOP: 25cc clear yellow urine  Fluid deficit: 115cc    Procedure: The patient was taken back to the operating room with IV fluids running. She was placed supine on the operating table and general anesthesia was induced. She was sterilely prepped and draped in the dorsolithotomy position in [address] [place] [address]. A timeout was performed indicating the correct patient with the correct procedure. First the bladder was drained for 25 cc of clear yellow urine with red rubber catheter. A bimanual exam was performed indicating a midposition uterus. The speculum was inserted into the vagina and a single-tooth tenaculum put to the anterior lip of the cervix. The speculum was removed and a right angle retractor placed in the posterior fornix of the vagina. The uterus was then sounded to 8 cm. The cervix was serially dilated to 6 mm with Hegar dilators. The true clear hysteroscope was inserted through the cervix into the endometrial cavity. Upon review of the cavity showed bilateral tubal ostia. The endometrium was hyperemic and thin with no polyps or submucosal fibroids noted.   Hysteroscopic curettage was performed circumferentially around the cavity to sample the lining, and then the hysteroscope was removed and sharp curettage was performed in order to completely sample due to the disordered proliferative endometrium that was noted on her endometrial biopsy in the office. After this all instruments were removed from the uterus, cervix and vagina. The tenaculum sites did require silver nitrate for hemostasis which was achieved. The patient tolerated procedure well. She was cleaned up, awoken from anesthesia, and transferred to recovery room in stable condition. Surgical findings were discussed with the patient's family at the end of the procedure.     Franko Chaney DO

## 2023-08-18 NOTE — ANESTHESIA PROCEDURE NOTES
Airway  Date/Time: 8/18/2023 1:10 PM  Urgency: elective    Airway not difficult    General Information and Staff    Patient location during procedure: OR  Anesthesiologist: Ximena Salgado MD  Performed: anesthesiologist   Performed by: Ximena Salgado MD  Authorized by: Ximena Salgado MD      Indications and Patient Condition  Indications for airway management: anesthesia  Spontaneous Ventilation: absent  Sedation level: deep  Preoxygenated: yes  Patient position: sniffing  Mask difficulty assessment: 1 - vent by mask  Planned trial extubation    Final Airway Details  Final airway type: supraglottic airway      Successful airway: classic  Size 4       Number of attempts at approach: 1  Number of other approaches attempted: 0

## 2023-08-25 ENCOUNTER — TELEPHONE (OUTPATIENT)
Dept: OBGYN CLINIC | Facility: CLINIC | Age: 48
End: 2023-08-25

## 2023-08-25 NOTE — TELEPHONE ENCOUNTER
Pt calling for D&C path results, informed that [address] indicate pathology is negative. Pt states understanding.  Assisted pt with scheduling 2 wk f/u

## 2023-09-01 ENCOUNTER — OFFICE VISIT (OUTPATIENT)
Dept: OBGYN CLINIC | Facility: CLINIC | Age: 48
End: 2023-09-01

## 2023-09-01 VITALS
HEART RATE: 102 BPM | BODY MASS INDEX: 46 KG/M2 | WEIGHT: 270 LBS | SYSTOLIC BLOOD PRESSURE: 137 MMHG | DIASTOLIC BLOOD PRESSURE: 89 MMHG

## 2023-09-01 DIAGNOSIS — Z09 SURGERY FOLLOW-UP: Primary | ICD-10-CM

## 2023-09-01 PROCEDURE — 99213 OFFICE O/P EST LOW 20 MIN: CPT | Performed by: OBSTETRICS & GYNECOLOGY

## 2023-09-01 PROCEDURE — 3075F SYST BP GE 130 - 139MM HG: CPT | Performed by: OBSTETRICS & GYNECOLOGY

## 2023-09-01 PROCEDURE — 3079F DIAST BP 80-89 MM HG: CPT | Performed by: OBSTETRICS & GYNECOLOGY

## 2023-10-16 ENCOUNTER — APPOINTMENT (OUTPATIENT)
Dept: GENERAL RADIOLOGY | Age: 48
End: 2023-10-16
Attending: PHYSICIAN ASSISTANT
Payer: COMMERCIAL

## 2023-10-16 ENCOUNTER — HOSPITAL ENCOUNTER (OUTPATIENT)
Age: 48
Discharge: HOME | End: 2023-10-16
Payer: COMMERCIAL

## 2023-10-16 VITALS
HEART RATE: 96 BPM | BODY MASS INDEX: 46 KG/M2 | RESPIRATION RATE: 20 BRPM | DIASTOLIC BLOOD PRESSURE: 80 MMHG | SYSTOLIC BLOOD PRESSURE: 125 MMHG | OXYGEN SATURATION: 98 % | TEMPERATURE: 99 F | HEIGHT: 64 IN

## 2023-10-16 DIAGNOSIS — J02.9 ACUTE PHARYNGITIS, UNSPECIFIED ETIOLOGY: ICD-10-CM

## 2023-10-16 DIAGNOSIS — Z20.822 ENCOUNTER FOR LABORATORY TESTING FOR COVID-19 VIRUS: ICD-10-CM

## 2023-10-16 DIAGNOSIS — R05.1 ACUTE COUGH: Primary | ICD-10-CM

## 2023-10-16 LAB
S PYO AG THROAT QL: NEGATIVE
SARS-COV-2 RNA RESP QL NAA+PROBE: NOT DETECTED

## 2023-10-16 PROCEDURE — 99213 OFFICE O/P EST LOW 20 MIN: CPT | Performed by: PHYSICIAN ASSISTANT

## 2023-10-16 PROCEDURE — U0002 COVID-19 LAB TEST NON-CDC: HCPCS | Performed by: NURSE PRACTITIONER

## 2023-10-16 PROCEDURE — 87880 STREP A ASSAY W/OPTIC: CPT | Performed by: PHYSICIAN ASSISTANT

## 2023-10-16 PROCEDURE — 71046 X-RAY EXAM CHEST 2 VIEWS: CPT | Performed by: PHYSICIAN ASSISTANT

## 2023-10-16 RX ORDER — CODEINE PHOSPHATE AND GUAIFENESIN 10; 100 MG/5ML; MG/5ML
5 SOLUTION ORAL EVERY 6 HOURS PRN
Qty: 50 ML | Refills: 0 | Status: SHIPPED | OUTPATIENT
Start: 2023-10-16

## 2023-10-16 RX ORDER — IBUPROFEN 600 MG/1
TABLET ORAL
Qty: 20 TABLET | Refills: 0 | Status: SHIPPED | OUTPATIENT
Start: 2023-10-16

## 2023-10-16 RX ORDER — ALBUTEROL SULFATE 90 UG/1
2 AEROSOL, METERED RESPIRATORY (INHALATION) EVERY 4 HOURS PRN
Qty: 1 EACH | Refills: 0 | Status: SHIPPED | OUTPATIENT
Start: 2023-10-16 | End: 2023-11-15

## 2023-10-16 RX ORDER — BENZONATATE 100 MG/1
100 CAPSULE ORAL 3 TIMES DAILY PRN
Qty: 30 CAPSULE | Refills: 0 | Status: SHIPPED | OUTPATIENT
Start: 2023-10-16 | End: 2023-11-15

## 2023-10-16 NOTE — ED INITIAL ASSESSMENT (HPI)
Pt presents to the IC with c/o a sore throat, headache, cough and nasal congestion since Friday. No known fevers.

## 2024-01-18 ENCOUNTER — HOSPITAL ENCOUNTER (OUTPATIENT)
Age: 49
Discharge: HOME | End: 2024-01-18
Payer: COMMERCIAL

## 2024-01-18 VITALS
SYSTOLIC BLOOD PRESSURE: 140 MMHG | OXYGEN SATURATION: 98 % | RESPIRATION RATE: 18 BRPM | HEART RATE: 106 BPM | TEMPERATURE: 100 F | DIASTOLIC BLOOD PRESSURE: 80 MMHG

## 2024-01-18 DIAGNOSIS — J03.90 ACUTE TONSILLITIS, UNSPECIFIED ETIOLOGY: Primary | ICD-10-CM

## 2024-01-18 LAB
POCT INFLUENZA A: NEGATIVE
POCT INFLUENZA B: NEGATIVE
S PYO AG THROAT QL: NEGATIVE
SARS-COV-2 RNA RESP QL NAA+PROBE: NOT DETECTED

## 2024-01-18 PROCEDURE — 87880 STREP A ASSAY W/OPTIC: CPT | Performed by: NURSE PRACTITIONER

## 2024-01-18 PROCEDURE — U0002 COVID-19 LAB TEST NON-CDC: HCPCS | Performed by: NURSE PRACTITIONER

## 2024-01-18 PROCEDURE — 87502 INFLUENZA DNA AMP PROBE: CPT | Performed by: NURSE PRACTITIONER

## 2024-01-18 PROCEDURE — 99213 OFFICE O/P EST LOW 20 MIN: CPT | Performed by: NURSE PRACTITIONER

## 2024-01-18 RX ORDER — PENICILLIN V POTASSIUM 500 MG/1
500 TABLET ORAL 2 TIMES DAILY
Qty: 10 TABLET | Refills: 0 | Status: SHIPPED | OUTPATIENT
Start: 2024-01-18 | End: 2024-01-23

## 2024-01-18 RX ORDER — DEXAMETHASONE SODIUM PHOSPHATE 10 MG/ML
10 INJECTION, SOLUTION INTRAMUSCULAR; INTRAVENOUS ONCE
Status: COMPLETED | OUTPATIENT
Start: 2024-01-18 | End: 2024-01-18

## 2024-01-18 NOTE — ED PROVIDER NOTES
Patient Seen in: [facility]      History     Chief Complaint   Patient presents with    Fatigue    Sore Throat    Body ache and/or chills     Stated Complaint: Sore Throat    Subjective:   HPI        Objective:   Past Medical History:   Diagnosis Date    Back pain     Back problem     Hiatal hernia     High cholesterol     History of blood transfusion 2011    no reactions    Hypercholesteremia     Morbid obesity with BMI of 45.0-49.9, adult (HCC)     Osteoarthritis     Vitamin D deficiency               Past Surgical History:   Procedure Laterality Date    COLONOSCOPY SCREENING - REFERRAL N/A 06/02/2023    Procedure: COLONOSCOPY-SCREENING/;  Surgeon: Malick Hilliard MD;  Location: [facility] ENDOSCOPY    HERNIA REPAIR      HERNIA SURGERY      umbilical    LAPS ABLTJ UTERINE FIBROIDS      OTHER SURGICAL HISTORY  01/11/2011    uterine fibroids removed                Social History     Socioeconomic History    Marital status: Single   Tobacco Use    Smoking status: Never    Smokeless tobacco: Never   Vaping Use    Vaping Use: Never used   Substance and Sexual Activity    Alcohol use: Yes     Comment: social    Drug use: No   Social History Narrative    ** Merged History Encounter **                   Review of Systems   Constitutional:  Positive for fever.   HENT:  Positive for sore throat.    Musculoskeletal:  Positive for myalgias.   All other systems reviewed and are negative.      Positive for stated complaint: Sore Throat  Other systems are as noted in HPI.  Constitutional and vital signs reviewed.      All other systems reviewed and negative except as noted above.    Physical Exam     ED Triage Vitals [01/18/24 1659]   /80   Pulse 106   Resp 18   Temp 100 °F (37.8 °C)   Temp src Temporal   SpO2 98 %   O2 Device None (Room air)       Current:/80   Pulse 106   Temp 100 °F (37.8 °C) (Temporal)   Resp 18   LMP 05/01/2023   SpO2 98%         Physical Exam  Vitals and nursing note  reviewed.   Constitutional:       General: She is awake. She is not in acute distress.     Appearance: Normal appearance. She is not ill-appearing, toxic-appearing or diaphoretic.   HENT:      Head: Normocephalic and atraumatic.      Right Ear: Tympanic membrane, ear canal and external ear normal. No tenderness. No middle ear effusion. Tympanic membrane is not erythematous.      Left Ear: Tympanic membrane, ear canal and external ear normal.  No middle ear effusion. Tympanic membrane is not erythematous.      Nose: Nose normal. No congestion or rhinorrhea.      Mouth/Throat:      Lips: Pink.      Mouth: Mucous membranes are moist. No oral lesions.      Pharynx: Oropharynx is clear. Uvula midline. Posterior oropharyngeal erythema present. No pharyngeal swelling, oropharyngeal exudate or uvula swelling.      Tonsils: No tonsillar exudate or tonsillar abscesses. 1+ on the right. 1+ on the left.   Eyes:      General: Lids are normal.      Extraocular Movements: Extraocular movements intact.      Conjunctiva/sclera: Conjunctivae normal.      Pupils: Pupils are equal, round, and reactive to light.   Cardiovascular:      Rate and Rhythm: Normal rate and regular rhythm.      Pulses: Normal pulses.      Heart sounds: Normal heart sounds.   Pulmonary:      Effort: Pulmonary effort is normal.      Breath sounds: Normal breath sounds.   Musculoskeletal:      Cervical back: Full passive range of motion without pain and normal range of motion.   Lymphadenopathy:      Cervical: Cervical adenopathy present.   Skin:     General: Skin is warm and dry.      Capillary Refill: Capillary refill takes less than 2 seconds.   Neurological:      General: No focal deficit present.      Mental Status: She is alert and oriented to person, place, and time.   Psychiatric:         Mood and Affect: Mood normal.         Behavior: Behavior normal. Behavior is cooperative.         Thought Content: Thought content normal.         Judgment: Judgment  normal.         ED Course     Labs Reviewed   POCT RAPID STREP - Normal   RAPID SARS-COV-2 BY PCR - Normal   POCT FLU TEST - Normal    Narrative:     This assay is a rapid molecular in vitro test utilizing nucleic acid amplification of influenza A and B viral RNA.     Negative, COVID-negative.  Strep negative.  Decadron ordered.  MDM       Medical Decision Making  Patient is well-appearing on exam, nontoxic in appearance, exam as noted above.  Differential diagnoses including but not limited to, COVID-19, viral versus bacterial pharyngitis, influenza, peritonsillar abscess.  Patient with clear speech, no drooling, no evidence of abscess.  Strep and COVID here negative.  Despite negative strep will treat at this time with antibiotics which patient agrees with.  Bilateral tonsillar hypertrophy, fever.  She was also given a dose of Decadron here.  Will treat with penicillin.  Close follow-up with PCP was recommended.    Problems Addressed:  Acute tonsillitis, unspecified etiology: acute illness or injury    Amount and/or Complexity of Data Reviewed  ECG/medicine tests: ordered and independent interpretation performed. Decision-making details documented in ED Course.    Risk  OTC drugs.  Prescription drug management.        Disposition and Plan     Clinical Impression:  1. Acute tonsillitis, unspecified etiology         Disposition:  Discharge  1/18/2024  5:36 pm    Follow-up:  Primary Care Provider                Medications Prescribed:  Current Discharge Medication List        START taking these medications    Details   penicillin v potassium 500 MG Oral Tab Take 1 tablet (500 mg total) by mouth 2 (two) times daily for 10 doses.  Qty: 10 tablet, Refills: 0

## 2024-03-12 ENCOUNTER — OFFICE VISIT (OUTPATIENT)
Dept: AUDIOLOGY | Facility: CLINIC | Age: 49
End: 2024-03-12

## 2024-03-12 ENCOUNTER — OFFICE VISIT (OUTPATIENT)
Dept: OTOLARYNGOLOGY | Facility: CLINIC | Age: 49
End: 2024-03-12
Payer: COMMERCIAL

## 2024-03-12 VITALS — HEIGHT: 64 IN | WEIGHT: 275 LBS | BODY MASS INDEX: 46.95 KG/M2

## 2024-03-12 DIAGNOSIS — H90.41 SENSORINEURAL HEARING LOSS (SNHL) OF RIGHT EAR WITH UNRESTRICTED HEARING OF LEFT EAR: Primary | ICD-10-CM

## 2024-03-12 DIAGNOSIS — H91.8X1 OTHER SPECIFIED HEARING LOSS OF RIGHT EAR, UNSPECIFIED HEARING STATUS ON CONTRALATERAL SIDE: Primary | ICD-10-CM

## 2024-03-12 DIAGNOSIS — H61.21 EXCESSIVE CERUMEN IN EAR CANAL, RIGHT: ICD-10-CM

## 2024-03-12 DIAGNOSIS — H69.90 EUSTACHIAN TUBE DISORDER, UNSPECIFIED LATERALITY: ICD-10-CM

## 2024-03-12 PROCEDURE — 92567 TYMPANOMETRY: CPT | Performed by: AUDIOLOGIST

## 2024-03-12 PROCEDURE — 92557 COMPREHENSIVE HEARING TEST: CPT | Performed by: AUDIOLOGIST

## 2024-03-12 NOTE — PROGRESS NOTES
Earnestine Rashid is a 49 year old female.   Chief Complaint   Patient presents with    New Patient    Ear Problem     Patient reports right ear is clogged.    Hearing Loss     Pt reports hearing loss issues in right ear.       ASSESSMENT AND PLAN:   1. Other specified hearing loss of right ear, unspecified hearing status on contralateral side  49-year-old presents with right ear issues that have been going on for several years she states.  She says that she had to have her ears drained in the past and that since then her right ear has not quite recovered.    On exam she had cerumen on the right.  She had slight retraction of her right tympanic membrane that was slightly opaque.    Her tympanogram she had a negative middle ear pressure of -230 on the right.  She had a nerve loss at 2000 Hz down to 30 dB and a small air-bone gap in the low and high frequencies.    Discussed that she may have an aspect of eustachian tube dysfunction given the very negative middle ear pressure on the tympanogram on the right side.  Discussed options for this she would like to first try Flonase at home she has used this in the past.  She like to avoid Sudafed or oral steroids due to her to her other health conditions.  If still not improved in 1 month may consider a CT scan of her middle ear given this longstanding issue or even a repeat myringotomy or ear tube if this still does not improve.    - Audiology Referral - In Network    2. Eustachian tube disorder, unspecified laterality      3. Excessive cerumen in ear canal, right        The patient indicates understanding of these issues and agrees to the plan.      EXAM:   Ht 5' 4\" (1.626 m)   Wt 275 lb (124.7 kg)   LMP 05/01/2023   BMI 47.20 kg/m²     Pertinent exam findings may also be noted above in assessment and plan     System Details   Skin Inspection - Normal.   Constitutional Overall appearance - Normal.   Head/Face Symmetric, TMJ tenderness not present    Eyes EOMI, PERRL    Right ear:  Canal clear, TM intact, no ANUJ   Left ear:  Canal clear, TM intact, no ANUJ   Nose: Septum midline, inferior turbinates not enlarged, nasal valves without collapse    Oral cavity/Oropharynx: No lesions or masses on inspection or palpation, tonsils symmetric    Neck: Soft without LAD, thyroid not enlarged  Voice clear/ no stridor   Other:      Scopes and Procedures:     Canals:  Right: Canal with cerumen preventing adequate view of TM, debrided with instrumentation    Tympanic Membranes:  Right: Normal tympanic membrane.     TM Visualized Method:   Right TM examined via otomicroscopy.      PROCEDURE:   Removal of cerumen impaction   The cerumen impaction was completely removed on the right side using microscopy as necessary.   Removal was completed by using a curette and suction.         Current Outpatient Medications   Medication Sig Dispense Refill    Spacer/Aero-Holding Chambers Does not apply Device Use with albuterol inhaler 1 each 0    rosuvastatin 10 MG Oral Tab Take 1 tablet (10 mg total) by mouth nightly. 90 tablet 4    guaiFENesin-codeine (CHERATUSSIN AC) 100-10 MG/5ML Oral Solution Take 5 mL by mouth every 6 (six) hours as needed for cough. (Patient not taking: Reported on 3/12/2024) 50 mL 0    ibuprofen 600 MG Oral Tab Take 1 tablet (600 mg total) by mouth every 6 hours with food (Patient not taking: Reported on 3/12/2024) 20 tablet 0    phenol 1.4 % Mouth/Throat Liquid Use as directed 1 mL in the mouth or throat every 2 (two) hours as needed. For 5 days (Patient not taking: Reported on 3/12/2024) 177 mL 0    traZODone 50 MG Oral Tab Take 1 tablet (50 mg total) by mouth nightly as needed for Sleep. (Patient not taking: Reported on 6/6/2023) 90 tablet 0    Cholecalciferol (VITAMIN D3) 1.25 MG (30832 UT) Oral Cap Take 1 tablet by mouth once a week. (Patient not taking: Reported on 3/12/2024) 12 capsule 4      Past Medical History:   Diagnosis Date    Back pain     Back problem     Hiatal hernia      High cholesterol     History of blood transfusion 2011    no reactions    Hypercholesteremia     Morbid obesity with BMI of 45.0-49.9, adult (HCC)     Osteoarthritis     Vitamin D deficiency       Social History:  Social History     Socioeconomic History    Marital status: Single   Tobacco Use    Smoking status: Never    Smokeless tobacco: Never   Vaping Use    Vaping Use: Never used   Substance and Sexual Activity    Alcohol use: Yes     Comment: social    Drug use: No   Social History Narrative    ** Merged History Encounter **               Roberto Carlos Pardo MD  3/12/2024  5:59 PM

## 2024-06-21 NOTE — TELEPHONE ENCOUNTER
Please review; protocol failed/No Protocol    Last Office Visit: 02/15/2023  Future Appointments   Date Time Provider Department Center   9/11/2024  9:00 AM Marie Stephen MD [facility]     No Active/Future labs pended     Requested Prescriptions   Pending Prescriptions Disp Refills    rosuvastatin 10 MG Oral Tab 90 tablet 4     Sig: Take 1 tablet (10 mg total) by mouth nightly.       Cholesterol Medication Protocol Failed - 6/19/2024 12:29 PM        Failed - ALT < 80     Lab Results   Component Value Date    ALT 14 02/15/2023             Failed - ALT resulted within past year        Failed - Lipid panel within past 12 months     Lab Results   Component Value Date    CHOLEST 211 (H) 02/15/2023    TRIG 72 02/15/2023    HDL 66 (H) 02/15/2023     (H) 02/15/2023    VLDL 13 02/15/2023    NONHDLC 145 (H) 02/15/2023             Passed - In person appointment or virtual visit in the past 12 mos or appointment in next 3 mos     Recent Outpatient Visits              3 months ago Sensorineural hearing loss (SNHL) of right ear with unrestricted hearing of left ear    [facility], Chapis Haji Au.D    Office Visit    3 months ago Other specified hearing loss of right ear, unspecified hearing status on contralateral side    [facility]    [facility]    9 months ago Surgery follow-up    [facility] - OB/Nishant Andrews DO    Office Visit    10 months ago PMB (postmenopausal bleeding)    [facility]    Telemedicine    10 months ago PMB (postmenopausal bleeding)    [facility]    Office Visit          Future Appointments         Provider Department Appt Notes    In 2 months Marie Stephen MD [facility],  [facility] MaurisioJorge weight loss, and sugar levels  lst px 2/15/23                       Future Appointments         Provider Department Appt Notes    In 2 months [facility]  lst px 2/15/23          Recent Outpatient Visits              3 months ago Sensorineural hearing loss (SNHL) of right ear with unrestricted hearing of left ear    [facility], Chapis Haji Au.D    Office Visit    3 months ago Other specified hearing loss of right ear, unspecified hearing status on contralateral side    [facility], Roberto Carlos Urena MD    Office Visit    9 months ago [facility] follow-[facility]    Office Visit    10 months ago PMB (postmenopausal bleeding)    [facility]    Telemedicine    10 months ago PMB (postmenopausal bleeding)    [facility]    Office Visit

## 2024-06-21 NOTE — TELEPHONE ENCOUNTER
Please review; protocol failed/No Protocol    Last Vitamin D Lab: 02/15/2023      Component  Ref Range & Units 2/15/23  9:23 AM   Vitamin D, 25OH, Total  30.0 - 100.0 ng/mL 9.2 Low      Last Office Visit: 02/15/2023  Future Appointments   Date Time Provider Department Center   9/11/2024  9:00 AM Marie Stephen MD [facility]     Requested Prescriptions   Pending Prescriptions Disp Refills    Cholecalciferol (VITAMIN D3) 1.25 MG (75870 UT) Oral Cap 12 capsule 4     Sig: Take 1 tablet by mouth once a week.       There is no refill protocol information for this order        Future Appointments         Provider Department Appt Notes    In 2 months Marie Stephen MD [facility] weight loss, and sugar levels  lst px 2/15/23          Recent Outpatient Visits              3 months ago Sensorineural hearing loss (SNHL) of right ear with unrestricted hearing of left ear    [facility], Chapis Haji Au.D    Office Visit    3 months ago Other specified hearing loss of right ear, unspecified hearing status on contralateral side    [facility]    [facility] Visit    9 months ago Surgery follow-up    [facility] - OB/Nishant Andrews DO    Office Visit    10 months ago PMB (postmenopausal bleeding)    [facility]    Telemedicine    10 months ago PMB (postmenopausal bleeding)    [facility]    Office Visit

## 2024-06-22 RX ORDER — FOLIC ACID 1 MG/1
1 TABLET ORAL WEEKLY
Qty: 4 CAPSULE | Refills: 0 | Status: SHIPPED | OUTPATIENT
Start: 2024-06-22

## 2024-06-22 RX ORDER — ROSUVASTATIN CALCIUM 10 MG/1
10 TABLET, COATED ORAL NIGHTLY
Qty: 90 TABLET | Refills: 0 | Status: SHIPPED | OUTPATIENT
Start: 2024-06-22

## 2024-09-11 ENCOUNTER — LAB ENCOUNTER (OUTPATIENT)
Dept: LAB | Age: 49
End: 2024-09-11
Attending: FAMILY MEDICINE
Payer: COMMERCIAL

## 2024-09-11 ENCOUNTER — OFFICE VISIT (OUTPATIENT)
Dept: FAMILY MEDICINE CLINIC | Facility: CLINIC | Age: 49
End: 2024-09-11
Payer: COMMERCIAL

## 2024-09-11 VITALS
WEIGHT: 265.38 LBS | DIASTOLIC BLOOD PRESSURE: 80 MMHG | HEART RATE: 78 BPM | BODY MASS INDEX: 45.3 KG/M2 | HEIGHT: 64 IN | SYSTOLIC BLOOD PRESSURE: 124 MMHG

## 2024-09-11 DIAGNOSIS — M47.9 SPONDYLOSIS: ICD-10-CM

## 2024-09-11 DIAGNOSIS — E55.9 VITAMIN D DEFICIENCY: ICD-10-CM

## 2024-09-11 DIAGNOSIS — M51.9 LUMBAR DISC DISEASE: ICD-10-CM

## 2024-09-11 DIAGNOSIS — E66.01 MORBID OBESITY WITH BMI OF 45.0-49.9, ADULT (HCC): ICD-10-CM

## 2024-09-11 DIAGNOSIS — Z12.31 SCREENING MAMMOGRAM, ENCOUNTER FOR: ICD-10-CM

## 2024-09-11 DIAGNOSIS — E78.00 HYPERCHOLESTEREMIA: ICD-10-CM

## 2024-09-11 DIAGNOSIS — Z00.00 ROUTINE MEDICAL EXAM: ICD-10-CM

## 2024-09-11 DIAGNOSIS — E66.01 MORBID OBESITY WITH BMI OF 45.0-49.9, ADULT (HCC): Primary | ICD-10-CM

## 2024-09-11 LAB
ALBUMIN SERPL-MCNC: 4.3 G/DL (ref 3.2–4.8)
ALBUMIN/GLOB SERPL: 1.3 {RATIO} (ref 1–2)
ALP LIVER SERPL-CCNC: 98 U/L
ALT SERPL-CCNC: 7 U/L
ANION GAP SERPL CALC-SCNC: 7 MMOL/L (ref 0–18)
AST SERPL-CCNC: 14 U/L (ref ?–34)
BASOPHILS # BLD AUTO: 0.05 X10(3) UL (ref 0–0.2)
BASOPHILS NFR BLD AUTO: 0.7 %
BILIRUB SERPL-MCNC: 0.4 MG/DL (ref 0.3–1.2)
BUN BLD-MCNC: 14 MG/DL (ref 9–23)
BUN/CREAT SERPL: 18.2 (ref 10–20)
CALCIUM BLD-MCNC: 9.4 MG/DL (ref 8.7–10.4)
CHLORIDE SERPL-SCNC: 107 MMOL/L (ref 98–112)
CHOLEST SERPL-MCNC: 232 MG/DL (ref ?–200)
CO2 SERPL-SCNC: 26 MMOL/L (ref 21–32)
CREAT BLD-MCNC: 0.77 MG/DL
DEPRECATED RDW RBC AUTO: 43.9 FL (ref 35.1–46.3)
EGFRCR SERPLBLD CKD-EPI 2021: 95 ML/MIN/1.73M2 (ref 60–?)
EOSINOPHIL # BLD AUTO: 0.19 X10(3) UL (ref 0–0.7)
EOSINOPHIL NFR BLD AUTO: 2.6 %
ERYTHROCYTE [DISTWIDTH] IN BLOOD BY AUTOMATED COUNT: 13.7 % (ref 11–15)
EST. AVERAGE GLUCOSE BLD GHB EST-MCNC: 123 MG/DL (ref 68–126)
FASTING PATIENT LIPID ANSWER: YES
FASTING STATUS PATIENT QL REPORTED: YES
GLOBULIN PLAS-MCNC: 3.4 G/DL (ref 2–3.5)
GLUCOSE BLD-MCNC: 94 MG/DL (ref 70–99)
HBA1C MFR BLD: 5.9 % (ref ?–5.7)
HCT VFR BLD AUTO: 38.5 %
HDLC SERPL-MCNC: 59 MG/DL (ref 40–59)
HGB BLD-MCNC: 12.2 G/DL
IMM GRANULOCYTES # BLD AUTO: 0.02 X10(3) UL (ref 0–1)
IMM GRANULOCYTES NFR BLD: 0.3 %
INSULIN SERPL-ACNC: 9 MU/L (ref 3–25)
LDLC SERPL CALC-MCNC: 159 MG/DL (ref ?–100)
LYMPHOCYTES # BLD AUTO: 2.38 X10(3) UL (ref 1–4)
LYMPHOCYTES NFR BLD AUTO: 32.4 %
MCH RBC QN AUTO: 27.6 PG (ref 26–34)
MCHC RBC AUTO-ENTMCNC: 31.7 G/DL (ref 31–37)
MCV RBC AUTO: 87.1 FL
MONOCYTES # BLD AUTO: 0.43 X10(3) UL (ref 0.1–1)
MONOCYTES NFR BLD AUTO: 5.9 %
NEUTROPHILS # BLD AUTO: 4.28 X10 (3) UL (ref 1.5–7.7)
NEUTROPHILS # BLD AUTO: 4.28 X10(3) UL (ref 1.5–7.7)
NEUTROPHILS NFR BLD AUTO: 58.1 %
NONHDLC SERPL-MCNC: 173 MG/DL (ref ?–130)
OSMOLALITY SERPL CALC.SUM OF ELEC: 290 MOSM/KG (ref 275–295)
PLATELET # BLD AUTO: 300 10(3)UL (ref 150–450)
POTASSIUM SERPL-SCNC: 4 MMOL/L (ref 3.5–5.1)
PROT SERPL-MCNC: 7.7 G/DL (ref 5.7–8.2)
RBC # BLD AUTO: 4.42 X10(6)UL
SODIUM SERPL-SCNC: 140 MMOL/L (ref 136–145)
TRIGL SERPL-MCNC: 83 MG/DL (ref 30–149)
TSI SER-ACNC: 1.69 MIU/ML (ref 0.55–4.78)
VIT B12 SERPL-MCNC: 624 PG/ML (ref 211–911)
VIT D+METAB SERPL-MCNC: 12.6 NG/ML (ref 30–100)
VLDLC SERPL CALC-MCNC: 16 MG/DL (ref 0–30)
WBC # BLD AUTO: 7.4 X10(3) UL (ref 4–11)

## 2024-09-11 PROCEDURE — 85025 COMPLETE CBC W/AUTO DIFF WBC: CPT

## 2024-09-11 PROCEDURE — 99396 PREV VISIT EST AGE 40-64: CPT | Performed by: FAMILY MEDICINE

## 2024-09-11 PROCEDURE — 80053 COMPREHEN METABOLIC PANEL: CPT

## 2024-09-11 PROCEDURE — 3008F BODY MASS INDEX DOCD: CPT | Performed by: FAMILY MEDICINE

## 2024-09-11 PROCEDURE — 90715 TDAP VACCINE 7 YRS/> IM: CPT | Performed by: FAMILY MEDICINE

## 2024-09-11 PROCEDURE — 83036 HEMOGLOBIN GLYCOSYLATED A1C: CPT

## 2024-09-11 PROCEDURE — 82306 VITAMIN D 25 HYDROXY: CPT

## 2024-09-11 PROCEDURE — 80061 LIPID PANEL: CPT

## 2024-09-11 PROCEDURE — 82607 VITAMIN B-12: CPT

## 2024-09-11 PROCEDURE — 83525 ASSAY OF INSULIN: CPT

## 2024-09-11 PROCEDURE — 3079F DIAST BP 80-89 MM HG: CPT | Performed by: FAMILY MEDICINE

## 2024-09-11 PROCEDURE — 36415 COLL VENOUS BLD VENIPUNCTURE: CPT

## 2024-09-11 PROCEDURE — 84443 ASSAY THYROID STIM HORMONE: CPT

## 2024-09-11 PROCEDURE — 90471 IMMUNIZATION ADMIN: CPT | Performed by: FAMILY MEDICINE

## 2024-09-11 PROCEDURE — 3074F SYST BP LT 130 MM HG: CPT | Performed by: FAMILY MEDICINE

## 2024-09-11 RX ORDER — ROSUVASTATIN CALCIUM 10 MG/1
10 TABLET, COATED ORAL NIGHTLY
Qty: 90 TABLET | Refills: 4 | Status: SHIPPED | OUTPATIENT
Start: 2024-09-11

## 2024-09-11 NOTE — PROGRESS NOTES
HPI:   Earnestine Rashid is a 49 year old female who presents for a complete physical exam.    Would like help with weight loss. Reports eats in evenings. Taking more MVI.   Would like to go back to see Dr. Her.   Having issues with her back. Pain in her lower back. Did see specialist in the past - has disease. Went to PT. Was going to the gym - was cross fit but hurt herself.   Taking water pill as needed.   Also right hand middle finger pain.   Wt Readings from Last 3 Encounters:   24 265 lb 6.4 oz (120.4 kg)   24 275 lb (124.7 kg)   23 270 lb (122.5 kg)     Body mass index is 45.56 kg/m².       Current Outpatient Medications   Medication Sig Dispense Refill    rosuvastatin 10 MG Oral Tab Take 1 tablet (10 mg total) by mouth nightly. 90 tablet 0    Cholecalciferol (VITAMIN D3) 1.25 MG (64575 UT) Oral Cap Take 1 capsule by mouth once a week. 4 capsule 0      Past Medical History:    Back pain    Back problem    Esophageal reflux    Headache    Hiatal hernia    High cholesterol    History of blood transfusion    no reactions    Hypercholesteremia    Hyperlipidemia    Morbid obesity with BMI of 45.0-49.9, adult (HCC)    Obesity    Osteoarthritis    Sleep apnea    Vitamin D deficiency      Past Surgical History:   Procedure Laterality Date    Colonoscopy screening - referral N/A 2023    Procedure: COLONOSCOPY-SCREENING/;  Surgeon: Malick Hilliard MD;  Location: [facility] ENDOSCOPY    Hernia repair      Hernia surgery      umbilical    Laps abltj uterine fibroids            Other surgical history  2011    uterine fibroids removed      Family History   Problem Relation Age of Onset    Heart Disorder Father     Lipids Father     Hypertension Father     Cancer Father     Diabetes Father     Obesity Father     Heart Disorder Mother     Lipids Mother     Hypertension Mother     Heart Disorder Maternal Grandmother     Cancer Maternal Grandmother     Diabetes Maternal Grandmother     Obesity  Maternal Grandmother     Ovarian Cancer Maternal Aunt         post    Ovarian Cancer Maternal Aunt         post    Obesity Maternal Grandfather     Heart Disorder Paternal Grandfather     Obesity Paternal Grandmother       Social History:   Social History     Socioeconomic History    Marital status: Single   Tobacco Use    Smoking status: Never    Smokeless tobacco: Never   Vaping Use    Vaping status: Never Used   Substance and Sexual Activity    Alcohol use: Yes     Comment: social    Drug use: No   Social History Narrative    ** Merged History Encounter **          Social Determinants of Health      Received from [facility]          REVIEW OF SYSTEMS:   GENERAL: feels well otherwise  Review of Systems   See HPI   EXAM:   /80   Pulse 78   Ht 5' 4\" (1.626 m)   Wt 265 lb 6.4 oz (120.4 kg)   BMI 45.56 kg/m²     GENERAL: well developed, well nourished,in no apparent distress  SKIN: no rashes,no suspicious lesions  HEENT: atraumatic, normocephalic,ears and throat are clear  EYES:PERRLA, EOMI, conjunctiva are clear  LUNGS: clear to auscultation  CARDIO: RRR without murmur  GI: good BS's,no masses, HSM or tenderness  EXTREMITIES: no cyanosis, clubbing or edema  NEURO: Oriented times three,cranial nerves are intact,motor and sensory are grossly intact    ASSESSMENT AND PLAN:   Earnestine Rashid is a 49 year old female who presents for a complete physical exam.    1. Morbid obesity with BMI of 45.0-49.9, adult (HCC)    - Insulin; Future  - Hemoglobin A1C; Future  - Jump Start Your Health; Future  - [facility] - Dr. Ari Her [facility] 9    2. Routine medical exam    - [facility] 2D+3D SCREENING BILAT (CPT=77067/39039); Future  - CBC With Differential With Platelet; Future  - Comp Metabolic Panel (14); Future  - Lipid Panel; Future  - TSH W Reflex To Free T4; Future  - Vitamin B12; Future  - Vitamin D; Future  - Insulin; Future  - Hemoglobin A1C;  Future    3. Vitamin D deficiency    - Vitamin D; Future    4. Screening mammogram, encounter for    - [facility] 2D+3D SCREENING BILAT (CPT=77067/09989); Future    5. Hypercholesteremia    - [facility] - Dr. Ari Her [facility] 9    6. Spondylosis  Focus on strength training.     7. Lumbar disc disease         Marie Stephen MD  9/11/2024  8:59 AM

## 2024-09-17 RX ORDER — FOLIC ACID 1 MG/1
50000 TABLET ORAL WEEKLY
Qty: 12 CAPSULE | Refills: 4 | Status: SHIPPED | OUTPATIENT
Start: 2024-09-17

## 2024-10-14 ENCOUNTER — HOSPITAL ENCOUNTER (EMERGENCY)
Facility: HOSPITAL | Age: 49
Discharge: HOME | End: 2024-10-14
Attending: STUDENT IN AN ORGANIZED HEALTH CARE EDUCATION/TRAINING PROGRAM
Payer: COMMERCIAL

## 2024-10-14 ENCOUNTER — APPOINTMENT (OUTPATIENT)
Dept: GENERAL RADIOLOGY | Facility: HOSPITAL | Age: 49
End: 2024-10-14
Attending: STUDENT IN AN ORGANIZED HEALTH CARE EDUCATION/TRAINING PROGRAM
Payer: COMMERCIAL

## 2024-10-14 VITALS
TEMPERATURE: 98 F | OXYGEN SATURATION: 97 % | DIASTOLIC BLOOD PRESSURE: 89 MMHG | HEART RATE: 68 BPM | SYSTOLIC BLOOD PRESSURE: 121 MMHG | WEIGHT: 267 LBS | RESPIRATION RATE: 16 BRPM | HEIGHT: 64 IN | BODY MASS INDEX: 45.58 KG/M2

## 2024-10-14 DIAGNOSIS — H81.10 BENIGN PAROXYSMAL POSITIONAL VERTIGO, UNSPECIFIED LATERALITY: ICD-10-CM

## 2024-10-14 DIAGNOSIS — S80.01XA CONTUSION OF RIGHT KNEE, INITIAL ENCOUNTER: Primary | ICD-10-CM

## 2024-10-14 LAB
ANION GAP SERPL CALC-SCNC: 9 MMOL/L (ref 0–18)
ATRIAL RATE: 73 BPM
BASOPHILS # BLD AUTO: 0.05 X10(3) UL (ref 0–0.2)
BASOPHILS NFR BLD AUTO: 0.7 %
BUN BLD-MCNC: 18 MG/DL (ref 9–23)
BUN/CREAT SERPL: 22.5 (ref 10–20)
CALCIUM BLD-MCNC: 9.4 MG/DL (ref 8.7–10.4)
CHLORIDE SERPL-SCNC: 108 MMOL/L (ref 98–112)
CO2 SERPL-SCNC: 25 MMOL/L (ref 21–32)
CREAT BLD-MCNC: 0.8 MG/DL
DEPRECATED RDW RBC AUTO: 45.8 FL (ref 35.1–46.3)
EGFRCR SERPLBLD CKD-EPI 2021: 90 ML/MIN/1.73M2 (ref 60–?)
EOSINOPHIL # BLD AUTO: 0.21 X10(3) UL (ref 0–0.7)
EOSINOPHIL NFR BLD AUTO: 2.9 %
ERYTHROCYTE [DISTWIDTH] IN BLOOD BY AUTOMATED COUNT: 14 % (ref 11–15)
GLUCOSE BLD-MCNC: 97 MG/DL (ref 70–99)
HCT VFR BLD AUTO: 38.6 %
HGB BLD-MCNC: 11.8 G/DL
IMM GRANULOCYTES # BLD AUTO: 0.02 X10(3) UL (ref 0–1)
IMM GRANULOCYTES NFR BLD: 0.3 %
LYMPHOCYTES # BLD AUTO: 2.41 X10(3) UL (ref 1–4)
LYMPHOCYTES NFR BLD AUTO: 33 %
MCH RBC QN AUTO: 27.4 PG (ref 26–34)
MCHC RBC AUTO-ENTMCNC: 30.6 G/DL (ref 31–37)
MCV RBC AUTO: 89.8 FL
MONOCYTES # BLD AUTO: 0.41 X10(3) UL (ref 0.1–1)
MONOCYTES NFR BLD AUTO: 5.6 %
NEUTROPHILS # BLD AUTO: 4.21 X10 (3) UL (ref 1.5–7.7)
NEUTROPHILS # BLD AUTO: 4.21 X10(3) UL (ref 1.5–7.7)
NEUTROPHILS NFR BLD AUTO: 57.5 %
OSMOLALITY SERPL CALC.SUM OF ELEC: 296 MOSM/KG (ref 275–295)
P AXIS: 61 DEGREES
P-R INTERVAL: 144 MS
PLATELET # BLD AUTO: 316 10(3)UL (ref 150–450)
POTASSIUM SERPL-SCNC: 4.3 MMOL/L (ref 3.5–5.1)
Q-T INTERVAL: 384 MS
QRS DURATION: 64 MS
QTC CALCULATION (BEZET): 423 MS
R AXIS: 18 DEGREES
RBC # BLD AUTO: 4.3 X10(6)UL
SODIUM SERPL-SCNC: 142 MMOL/L (ref 136–145)
T AXIS: 17 DEGREES
VENTRICULAR RATE: 73 BPM
WBC # BLD AUTO: 7.3 X10(3) UL (ref 4–11)

## 2024-10-14 PROCEDURE — 99284 EMERGENCY DEPT VISIT MOD MDM: CPT

## 2024-10-14 PROCEDURE — 93005 ELECTROCARDIOGRAM TRACING: CPT

## 2024-10-14 PROCEDURE — 73560 X-RAY EXAM OF KNEE 1 OR 2: CPT | Performed by: STUDENT IN AN ORGANIZED HEALTH CARE EDUCATION/TRAINING PROGRAM

## 2024-10-14 PROCEDURE — 80048 BASIC METABOLIC PNL TOTAL CA: CPT | Performed by: STUDENT IN AN ORGANIZED HEALTH CARE EDUCATION/TRAINING PROGRAM

## 2024-10-14 PROCEDURE — 36415 COLL VENOUS BLD VENIPUNCTURE: CPT

## 2024-10-14 PROCEDURE — 85025 COMPLETE CBC W/AUTO DIFF WBC: CPT | Performed by: STUDENT IN AN ORGANIZED HEALTH CARE EDUCATION/TRAINING PROGRAM

## 2024-10-14 PROCEDURE — 93010 ELECTROCARDIOGRAM REPORT: CPT

## 2024-10-14 RX ORDER — MECLIZINE HYDROCHLORIDE 25 MG/1
25 TABLET ORAL 3 TIMES DAILY PRN
Qty: 30 TABLET | Refills: 0 | Status: SHIPPED | OUTPATIENT
Start: 2024-10-14

## 2024-10-14 NOTE — DISCHARGE INSTRUCTIONS
Take Tylenol or ibuprofen as needed for pain call your primary care doctor for follow-up and to get a referral for physical therapy.  Return to the ER for worsening pain swelling numbness weakness or tingling or any new concerns.

## 2024-10-14 NOTE — ED PROVIDER NOTES
Patient Seen in: [facility] Emergency Department      History     Chief Complaint   Patient presents with    Fall     Right knee injury     Stated Complaint: right knee injury    Subjective:   HPI    49-year-old female with history of hyperlipidemia GERD presenting for evaluation of right knee injury.  She states yesterday she was playing football with her family.  She ran about 10 feet and then fell.  She thinks that she felt dizzy before she fell.  She describes this as a room spinning sensation.  Has been having this issue intermittently.  Seems worse when she changes position suddenly.  No associated headache.  Denies any head or neck injury.  Has pain in the anterior and lateral right knee.  No numbness weakness or tingling.  Able to bear weight.      Objective:     No pertinent past medical history.            No pertinent past surgical history.              No pertinent social history.                Physical Exam     ED Triage Vitals [10/14/24 0714]   /87   Pulse 75   Resp 18   Temp 97.6 °F (36.4 °C)   Temp src    SpO2 94 %   O2 Device None (Room air)       Current Vitals:   Vital Signs  BP: 121/89  Pulse: 68  Resp: 16  Temp: 97.6 °F (36.4 °C)  MAP (mmHg): (!) 106    Oxygen Therapy  SpO2: 97 %  O2 Device: None (Room air)        Physical Exam  Constitutional: awake, alert, no sig distress  HENT: mmm, no lesions,  Neck: normal range of motion, no tenderness, supple.  Eyes: PERRL, EOMI, conjunctiva normal, no discharge. Sclera anicteric.  Cardiovascular: rr no murmur  Respiratory: Normal breath sounds, no respiratory distress, no wheezing, no chest tenderness.  GI: Bowel sounds normal, Soft, no tenderness, no masses, no pulsatile masses.  : No CVA tenderness.  Skin: Warm, dry, no erythema, no rash.  Musculoskeletal: Intact distal pulses,  abrasion and tenderness anterior and lateral right knee along the joint line.  No ligamentous instability.  DP PT pulse 2+ and symmetric. No cyanosis, no  clubbing. Good range of motion in all major joints. No tenderness to palpation or major deformities noted. Back- No tenderness.  Neurologic: Alert & oriented x 3, normal motor function, normal sensory function, no focal deficits noted.  Psych: Calm, cooperative, nl affect        ED Course     Labs Reviewed   BASIC METABOLIC PANEL (8) - Abnormal; Notable for the following components:       Result Value    BUN/CREA Ratio 22.5 (*)     Calculated Osmolality 296 (*)     All other components within normal limits   CBC WITH DIFFERENTIAL WITH PLATELET - Abnormal; Notable for the following components:    HGB 11.8 (*)     MCHC 30.6 (*)     All other components within normal limits                ED Course as of 10/14/24 1050  ------------------------------------------------------------  Time: 10/14 0743  Comment: EKG as interpreted by ED physician: NSR 73 bpm, normal axis/intervals, no st or t wave changes, no stemi                 MDM      49F with history as documented above presenting for evaluation.   On arrival vitals are stable and reassuring  DDx includes knee contusion, patellar fracture, ligamentous injury  -With regard to her dizziness, this seems most compatible peripheral vertigo given normal neurologic examination cerebellar signs, intermittent positional nature of symptoms    Reviewed x-ray of right knee independently do not appreciate patellar fracture as clinically queried.      Return precautions and follow-up instructions were discussed with patient who voiced understanding and agreement the plan.  All questions were answered to patient satisfaction.    Medical Decision Making      Disposition and Plan     Clinical Impression:  1. Contusion of right knee, initial encounter    2. Benign paroxysmal positional vertigo, unspecified laterality         Disposition:  Discharge  10/14/2024  9:03 am    Follow-up:  [facility] Emergency Department  [address]  [address]  [phone]  Follow up  As needed, If symptoms worsen    Marie Stephen MD  [address] [place] [address]  [address]  [facility] [address]  [phone]    Call            Medications Prescribed:  Discharge Medication List as of 10/14/2024  9:19 AM        START taking these medications    Details   meclizine 25 MG Oral Tab Take 1 tablet (25 mg total) by mouth 3 (three) times daily as needed., Normal, Disp-30 tablet, R-0                 Supplementary Documentation:

## 2025-01-08 NOTE — ED QUICK NOTES
Rounding completed    No complaints at this time  Awaiting lab/imaging results  Elimination assistance offered  No additional needs at this time  Call light within reach, will update patient with more information  Will continue to monitor regular

## (undated) DEVICE — YANKAUER SUCTION INSTRUMENT NO CONTROL VENT, BULB TIP, CLEAR: Brand: YANKAUER

## (undated) DEVICE — SEAL TRUCLEAR  HYSTERSCOP

## (undated) DEVICE — WOLF INFLOW HYSTER

## (undated) DEVICE — MEDI-VAC NON-CONDUCTIVE SUCTION TUBING: Brand: CARDINAL HEALTH

## (undated) DEVICE — DRAPE,UNDRBUT,WHT GRAD PCH,CAPPORT,20/CS: Brand: MEDLINE

## (undated) DEVICE — GAMMEX® PI HYBRID SIZE 6.5, STERILE POWDER-FREE SURGICAL GLOVE, POLYISOPRENE AND NEOPRENE BLEND: Brand: GAMMEX

## (undated) DEVICE — CONMED SCOPE SAVER BITE BLOCK, 20X27 MM: Brand: SCOPE SAVER

## (undated) DEVICE — STERILE SURGICAL LUBRICANT, METAL TUBE: Brand: SURGILUBE

## (undated) DEVICE — FORCEP RADIAL JAW 4

## (undated) DEVICE — SOLUTION  .9 3000ML

## (undated) DEVICE — SOCK CNSTR 4IN TNPSL UNV SPEC

## (undated) DEVICE — KIT ENDO ORCAPOD 160/180/190

## (undated) DEVICE — HYSTEROSCOPY: Brand: MEDLINE INDUSTRIES, INC.

## (undated) DEVICE — STIRRUP STRAP W/SLING RING

## (undated) DEVICE — SET TUBI Y FL CNTRL INFL/OTFL

## (undated) DEVICE — SOL NACL IRRIG 0.9% 1000ML BTL

## (undated) DEVICE — 60 ML SYRINGE REGULAR TIP: Brand: MONOJECT

## (undated) DEVICE — MYOSURE SINGLE USE SEAL SET

## (undated) DEVICE — Device

## (undated) DEVICE — Device: Brand: DUAL NARE NASAL CANNULAE FEMALE LUER CON 7FT O2 TUBE

## (undated) DEVICE — SNARE ENDOSCOPIC 10MM ROUND

## (undated) DEVICE — MEDI-VAC NON-CONDUCTIVE SUCTION TUBING 6MM X 1.8M (6FT.) L: Brand: CARDINAL HEALTH

## (undated) DEVICE — ENCORE® LATEX MICRO SIZE 7, STERILE LATEX POWDER-FREE SURGICAL GLOVE: Brand: ENCORE

## (undated) DEVICE — KIT CLEAN ENDOKIT 1.1OZ GOWNX2

## (undated) NOTE — LETTER
07/26/21        Nely Smyth  [address]  [facility] [address]      Dear Christiano Kirkland records indicate that you have outstanding lab work and or testing that was ordered for you and has not yet been completed:  Orders Placed This Encounter      M

## (undated) NOTE — ED AVS SNAPSHOT
Spike Reynoso   MRN: [id number]    Department:  [facility] Emergency Department   Date of Visit:  10/22/2017           Disclosure     Insurance plans vary and the physician(s) referred by the ER may not be covered by your plan.  Please contact y CARE PHYSICIAN AT ONCE OR RETURN IMMEDIATELY TO THE EMERGENCY DEPARTMENT. If you have been prescribed any medication(s), please fill your prescription right away and begin taking the medication(s) as directed.   If you believe that any of the medications

## (undated) NOTE — LETTER
2019    Patient: Roshan Felder  : 1975 Visit date: 2019    Dear  Dr. Dalila Santos MD,    Rogerio Sheikh is a pleasant 40year old female, who was referred to us for weight management recommendations. Rogerio Mohang is interested in our weight loss programs. rate. She understands that I will not call in the prescription for her; she has to have an appointment to have the medication refilled. Will start at 15 mg  I would like to thank you for referring Cindy Berg to me.   Please feel free to contact me if t

## (undated) NOTE — LETTER
08/02/21        [address]  [facility] [address]      Dear Khanh Hodges,    [facility] records indicate that you have outstanding lab work and or testing that was ordered for you and has not yet been completed:  Orders Placed This Encounter      L

## (undated) NOTE — LETTER
Date & Time: 10/14/2024, 9:23 AM  Patient: Earnestine Rashid  Encounter Provider(s):    Raul Broderick MD Dolan, Alan, RN       To Whom It May Concern:    Earnestine Rashid was seen and treated in our department on 10/14/2024. She should not return to work until Wednesday, 10 /16/2024.    If you have any questions or concerns, please do not hesitate to call.        _____________________________  RN for MD

## (undated) NOTE — LETTER
MINOR CASE LETTER      8/4/2023        Dear Roge Deluca,    Your are having a Dilation and Curettage,Hysteroscopy, possible Truclear polypectomy on Friday,08/18/2023 at 2:30pm at [facility].    Do not eat or drink anything (including water) after midnight the night before surgery. A prescription called Cytotec has been sent to your pharmacy, you must take it at bedtime the night before surgery. If your procedure is scheduled later in the day, then nothing by mouth for 6 hours before arrival to the hospital.    Janny Meza are to call this office if you have any cold or flu symptoms 2 days before your scheduled surgery. Please avoid ALL aspirin and herbal supplements 7 days before surgery. Avoid Ibuprofen, Motrin, Aleve, or Naprosyn for 3 days before surgery. Please avoid ALL Cannabis use 24 hours prior to surgery. You cannot wear hair pins,wigs,artificial nail or metallic nails/ nail polish for surgery. You will be contacted by PreAdmission Testing (PAT) usually within the week before surgery. They will take a short medical history and let you know if any preoperative testing is needed. If you have any questions for preadmission testing please feel free to contact them by calling [phone]. I contacted your insurance and was advised no prior authorization is needed for surgery. Please make arrangements for someone to drive you home after your surgery. Call our office now to schedule your post-operative appointment for 2 weeks after surgery. Please feel free to contact our office at  if you have any questions regarding these instructions or your procedure.       Sincerely,          DO ROSALBA Ch-Cosby, [place] - OB/GYN  [facility]  [id number] [place] [address] [address] [address] [phone]    Document electronically generated by:  Mynor Asencio

## (undated) NOTE — LETTER
10/26/2017          To Whom It May Concern:    Read Moses is currently under my medical care and may not return to work at this time. Please excuse Ekn Enio for 7 days or any missed work. She may return on 10/30/2017.    If you require additional infor

## (undated) NOTE — ED AVS SNAPSHOT
Мария Klein   MRN: [id number]    Department:  [facility] Emergency Department   Date of Visit:  1/7/2019           Disclosure     Insurance plans vary and the physician(s) referred by the ER may not be covered by your plan.  Please contact you CARE PHYSICIAN AT ONCE OR RETURN IMMEDIATELY TO THE EMERGENCY DEPARTMENT. If you have been prescribed any medication(s), please fill your prescription right away and begin taking the medication(s) as directed.   If you believe that any of the medications

## (undated) NOTE — LETTER
[address] [facility] [address] [place], [place]  Authorization for Invasive Procedure                                                                                           I hereby authorize Caye Fabry, MD, my physician and his/her assistants (if applicable), which may include medical students, residents, and/or fellows, to perform the following surgical operation/ procedure and administer such anesthesia as may be determined necessary by my physician: Operation/Procedure name (s) COLONOSCOPY-SCREENING/ ESOPHAGOGASTRODUODENOSCOPY (EGD) on Tien Quintanilla   2. I recognize that during the surgical operation/procedure, unforeseen conditions may necessitate additional or different procedures than those listed above. I, therefore, further authorize and request that the above-named surgeon, assistants, or designees perform such procedures as are, in their judgment, necessary and desirable. 3.   My surgeon/physician has discussed prior to my surgery the potential benefits, risks and side effects of this procedure; the likelihood of achieving goals; and potential problems that might occur during recuperation. They also discussed reasonable alternatives to the procedure, including risks, benefits, and side effects related to the alternatives and risks related to not receiving this procedure. I have had all my questions answered and I acknowledge that no guarantee has been made as to the result that may be obtained. 4.   Should the need arise during my operation/procedure, which includes change of level of care prior to discharge, I also consent to the administration of blood and/or blood products. Further, I understand that despite careful testing and screening of blood or blood products by collecting agencies, I may still be subject to ill effects as a result of receiving a blood transfusion and/or blood products.   The following are some, but not all, of the potential risks that can occur: fever and allergic reactions, hemolytic reactions, transmission of diseases such as Hepatitis, AIDS and Cytomegalovirus (CMV) and fluid overload. In the event that I wish to have an autologous transfusion of my own blood, or a directed donor transfusion, I will discuss this with my physician. Check only if Refusing Blood or Blood Products  I understand refusal of blood or blood products as deemed necessary by my physician may have serious consequences to my condition to include possible death. I hereby assume responsibility for my refusal and release the hospital, its personnel, and my physicians from any responsibility for the consequences of my refusal.    o  Refuse   5. I authorize the use of any specimen, organs, tissues, body parts or foreign objects that may be removed from my body during the operation/procedure for diagnosis, research or teaching purposes and their subsequent disposal by hospital authorities. I also authorize the release of specimen test results and/or written reports to my treating physician on the hospital medical staff or other referring or consulting physicians involved in my care, at the discretion of the Pathologist or my treating physician. 6.   I consent to the photographing or videotaping of the operations or procedures to be performed, including appropriate portions of my body for medical, scientific, or educational purposes, provided my identity is not revealed by the pictures or by descriptive texts accompanying them. If the procedure has been photographed/videotaped, the surgeon will obtain the original picture, image, videotape or CD. The hospital will not be responsible for storage, release or maintenance of the picture, image, tape or CD.    7.   I consent to the presence of a  or observers in the operating room as deemed necessary by my physician or their designees.     8.   I recognize that in the event my procedure results in extended X-Ray/fluoroscopy time, I may develop a skin reaction. 9. If I have a Do Not Attempt Resuscitation (DNAR) order in place, that status will be suspended while in the operating room, procedural suite, and during the recovery period unless otherwise explicitly stated by me (or a person authorized to consent on my behalf). The surgeon or my attending physician will determine when the applicable recovery period ends for purposes of reinstating the DNAR order. 10. Patients having a sterilization procedure: I understand that if the procedure is successful the results will be permanent and it will therefore be impossible for me to inseminate, conceive, or bear children. I also understand that the procedure is intended to result in sterility, although the result has not been guaranteed. 11. I acknowledge that my physician has explained sedation/analgesia administration to me including the risk and benefits I consent to the administration of sedation/analgesia as may be necessary or desirable in the judgment of my physician. I CERTIFY THAT I HAVE READ AND FULLY UNDERSTAND THE ABOVE CONSENT TO OPERATION and/or OTHER PROCEDURE.     _________________________________________ _________________________________     ___________________________________  Signature of Patient     Signature of Responsible Person                   Printed Name of Responsible Person                              _________________________________________ ______________________________        ___________________________________  Signature of Witness         Date  Time         Relationship to Patient    STATEMENT OF PHYSICIAN My signature below affirms that prior to the time of the procedure; I have explained to the patient and/or his/her legal representative, the risks and benefits involved in the proposed treatment and any reasonable alternative to the proposed treatment.  I have also explained the risks and benefits involved in refusal of the proposed treatment and alternatives to the proposed treatment and have answered the patient's questions.  If I have a significant financial interest in a co-management agreement or a significant financial interest in any product or implant, or other significant relationship used in this procedure/surgery, I have disclosed this and had a discussion with my patient.     _______________________________________________________________ _____________________________  (Signature of Physician)                                                                                         (Date)                                   (Time)  Patient Name: Jo Ann Carpio    : 1975   Printed: 2023      Medical Record #: [id number]                                              Page 1 of 1

## (undated) NOTE — LETTER
[address] [facility] [address], [place]  Authorization for Invasive Procedure                                                                                           I hereby authorize Marion Paula MD, my physician and his/her assistants (if applicable), which may include medical students, residents, and/or fellows, to perform the following surgical operation/ procedure and administer such anesthesia as may be determined necessary by my physician: Operation/Procedure name (s) COLONOSCOPY-SCREENING/ ESOPHAGOGASTRODUODENOSCOPY on Noemi Grammes   2. I recognize that during the surgical operation/procedure, unforeseen conditions may necessitate additional or different procedures than those listed above. I, therefore, further authorize and request that the above-named surgeon, assistants, or designees perform such procedures as are, in their judgment, necessary and desirable. 3.   My surgeon/physician has discussed prior to my surgery the potential benefits, risks and side effects of this procedure; the likelihood of achieving goals; and potential problems that might occur during recuperation. They also discussed reasonable alternatives to the procedure, including risks, benefits, and side effects related to the alternatives and risks related to not receiving this procedure. I have had all my questions answered and I acknowledge that no guarantee has been made as to the result that may be obtained. 4.   Should the need arise during my operation/procedure, which includes change of level of care prior to discharge, I also consent to the administration of blood and/or blood products. Further, I understand that despite careful testing and screening of blood or blood products by collecting agencies, I may still be subject to ill effects as a result of receiving a blood transfusion and/or blood products.   The following are some, but not all, of the potential risks that can occur: fever and allergic reactions, hemolytic reactions, transmission of diseases such as Hepatitis, AIDS and Cytomegalovirus (CMV) and fluid overload. In the event that I wish to have an autologous transfusion of my own blood, or a directed donor transfusion, I will discuss this with my physician. Check only if Refusing Blood or Blood Products  I understand refusal of blood or blood products as deemed necessary by my physician may have serious consequences to my condition to include possible death. I hereby assume responsibility for my refusal and release the hospital, its personnel, and my physicians from any responsibility for the consequences of my refusal.    o  Refuse   5. I authorize the use of any specimen, organs, tissues, body parts or foreign objects that may be removed from my body during the operation/procedure for diagnosis, research or teaching purposes and their subsequent disposal by hospital authorities. I also authorize the release of specimen test results and/or written reports to my treating physician on the hospital medical staff or other referring or consulting physicians involved in my care, at the discretion of the Pathologist or my treating physician. 6.   I consent to the photographing or videotaping of the operations or procedures to be performed, including appropriate portions of my body for medical, scientific, or educational purposes, provided my identity is not revealed by the pictures or by descriptive texts accompanying them. If the procedure has been photographed/videotaped, the surgeon will obtain the original picture, image, videotape or CD. The hospital will not be responsible for storage, release or maintenance of the picture, image, tape or CD.    7.   I consent to the presence of a  or observers in the operating room as deemed necessary by my physician or their designees.     8.   I recognize that in the event my procedure results in extended X-Ray/fluoroscopy time, I may develop a skin reaction. 9. If I have a Do Not Attempt Resuscitation (DNAR) order in place, that status will be suspended while in the operating room, procedural suite, and during the recovery period unless otherwise explicitly stated by me (or a person authorized to consent on my behalf). The surgeon or my attending physician will determine when the applicable recovery period ends for purposes of reinstating the DNAR order. 10. Patients having a sterilization procedure: I understand that if the procedure is successful the results will be permanent and it will therefore be impossible for me to inseminate, conceive, or bear children. I also understand that the procedure is intended to result in sterility, although the result has not been guaranteed. 11. I acknowledge that my physician has explained sedation/analgesia administration to me including the risk and benefits I consent to the administration of sedation/analgesia as may be necessary or desirable in the judgment of my physician. I CERTIFY THAT I HAVE READ AND FULLY UNDERSTAND THE ABOVE CONSENT TO OPERATION and/or OTHER PROCEDURE.     _________________________________________ _________________________________     ___________________________________  Signature of Patient     Signature of Responsible Person                   Printed Name of Responsible Person                              _________________________________________ ______________________________        ___________________________________  Signature of Witness         Date  Time         Relationship to Patient    STATEMENT OF PHYSICIAN My signature below affirms that prior to the time of the procedure; I have explained to the patient and/or his/her legal representative, the risks and benefits involved in the proposed treatment and any reasonable alternative to the proposed treatment.  I have also explained the risks and benefits involved in refusal of the proposed treatment and alternatives to the proposed treatment and have answered the patient's questions.  If I have a significant financial interest in a co-management agreement or a significant financial interest in any product or implant, or other significant relationship used in this procedure/surgery, I have disclosed this and had a discussion with my patient.     _______________________________________________________________ _____________________________  (Signature of Physician)                                                                                         (Date)                                   (Time)  Patient Name: Neeraj Kwok    : 1975   Printed: 2023      Medical Record #: [id number]                                              Page 1 of 1

## (undated) NOTE — LETTER
AUTHORIZATION FOR SURGICAL OPERATION OR OTHER PROCEDURE    1. I hereby authorize Dr. Suad Soto, and [address] [place] [address] staff assigned to my case to perform the following operation and/or procedure at the [address] [place] [address]:    _______________________________________________________________________________________________      _______________________________________________________________________________________________    2. My physician has explained the nature and purpose of the operation or other procedure, possible alternative methods of treatment, the risks involved, and the possibility of complication to me. I acknowledge that no guarantee has been made as to the result that may be obtained. 3.  I recognize that, during the course of this operation, or other procedure, unforseen conditions may necessitate additional or different procedure than those listed above. I, therefore, further authorize and request that the above named physician, his/her physician assistants or designees perform such procedures as are, in his/her professional opinion, necessary and desirable. 4.  Any tissue or organs removed in the operation or other procedure may be disposed of by and at the discretion of the [address] [place] [address] and [facility]. 5.  I understand that in the event of a medical emergency, I will be transported by local paramedics to [facility] or other hospital emergency department. 6.  I certify that I have read and fully understand the above consent to operation and/or other procedure. 7.  I acknowledge that my physician has explained sedation/analgesia administration to me including the risks and benefits. I consent to the administration of sedation/analgesia as may be necessary or desirable in the judgement of my physician. Witness signature: ___________________________________________________ Date:  ______/______/_____                    Time:  ________ A. M. P.M.       Patient Name:  ______________________________________________________  (please print)      Patient signature:  ___________________________________________________             Relationship to Patient:           []  Parent    Responsible person                          []  Spouse  In case of minor or                    [] Other  _____________   Incompetent name:  __________________________________________________                               (please print)      _____________      Responsible person  In case of minor or  Incompetent signature:  _______________________________________________    Statement of Physician  My signature below affirms that prior to the time of the procedure, I have explained to the patient and/or his/her guardian, the risks and benefits involved in the proposed treatment and any reasonable alternative to the proposed treatment. I have also explained the risks and benefits involved in the refusal of the proposed treatment and have answered the patient's questions.                         Date:  ______/______/_______  Provider                      Signature:  __________________________________________________________       Time:  ___________ A.M    P.M.

## (undated) NOTE — LETTER
Date & Time: 6/3/2022, 12:11 PM  Patient: Racquel Griffith  Encounter Provider(s):    Kenroy Shetty MD       To Whom It May Concern:    Jalil Miramontes was seen and treated in our department on 6/3/2022. She should not return to work until 6/4/2022.     If you have any questions or concerns, please do not hesitate to call.        _____________________________  Physician/APC Signature

## (undated) NOTE — LETTER
AUTHORIZATION FOR SURGICAL OPERATION OR OTHER PROCEDURE    1. I hereby authorize Dr. Damon Delgado and [facility] [employer] [facility] assigned to my case to perform the following operation and/[facility]:    COLPOSCOPY      _______________________________________________________________________________________________    2. My physician has explained the nature and purpose of the operation or other procedure, possible alternative methods of treatment, the risks involved, and the possibility of complication to me. I acknowledge that no guarantee has been made as to the result that may be obtained. 3.  I recognize that, during the course of this operation, or other procedure, unforseen conditions may necessitate additional or different procedure than those listed above. I, therefore, further authorize and request that the above named physician, his/her physician assistants or designees perform such procedures as are, in his/her professional opinion, necessary and desirable. 4.  Any tissue or organs removed in the operation or other procedure may be disposed of by and at the discretion of the [facility] and [facility]. 5.  I understand that in the event of a medical emergency, I will be transported by local paramedics to [facility] or other hospital emergency department. 6.  I certify that I have read and fully understand the above consent to operation and/or other procedure. 7.  I acknowledge that my physician has explained sedation/analgesia administration to me including the risks and benefits. I consent to the administration of sedation/analgesia as may be necessary or desirable in the judgement of my physician. Witness signature: ___________________________________________________ Date:  ______/______/_____                    Time:  ________ A. M.  P.M.        Patient Name:  ______________________________________________________  (please print)      Patient signature:  ___________________________________________________             Relationship to Patient:           []  Parent    Responsible person                          []  Spouse  In case of minor or                    [] Other  _____________   Incompetent name:  __________________________________________________                               (please print)      _____________      Responsible person  In case of minor or  Incompetent signature:  _______________________________________________    Statement of Physician  My signature below affirms that prior to the time of the procedure, I have explained to the patient and/or his/her guardian, the risks and benefits involved in the proposed treatment and any reasonable alternative to the proposed treatment. I have also explained the risks and benefits involved in the refusal of the proposed treatment and have answered the patient's questions.                         Date:  ______/______/_______  Provider                      Signature:  __________________________________________________________       Time:  ___________ A.M    P.M.

## (undated) NOTE — LETTER
AUTHORIZATION FOR SURGICAL OPERATION OR OTHER PROCEDURE    1.  I hereby authorize Dr. Nohelia Edwards and [facility] assigned to my case to perform the following operation and/or procedure at the [facility]:    _______COLPOSCOPY______ Patient signature:  ___________________________________________________             Relationship to Patient:           []  Parent    Responsible person                          []  Spouse  In case of minor or                    [] Other  _____________   In

## (undated) NOTE — LETTER
Date & Time: 2/19/2020, 11:43 AM  Patient: Joel Wilhelm  Encounter Provider(s):    Tanner Coronado MD       To Whom It May Concern:    Falguni Guerra was seen and treated in our department on 2/19/2020. She should not return to work until 2/20/2020.